# Patient Record
Sex: MALE | Race: WHITE | NOT HISPANIC OR LATINO | Employment: FULL TIME | URBAN - NONMETROPOLITAN AREA
[De-identification: names, ages, dates, MRNs, and addresses within clinical notes are randomized per-mention and may not be internally consistent; named-entity substitution may affect disease eponyms.]

---

## 2018-03-19 ENCOUNTER — OFFICE VISIT CONVERTED (OUTPATIENT)
Dept: FAMILY MEDICINE CLINIC | Age: 48
End: 2018-03-19
Attending: NURSE PRACTITIONER

## 2018-06-20 ENCOUNTER — APPOINTMENT (OUTPATIENT)
Dept: PREADMISSION TESTING | Facility: HOSPITAL | Age: 48
End: 2018-06-20

## 2018-06-20 VITALS
DIASTOLIC BLOOD PRESSURE: 89 MMHG | HEART RATE: 76 BPM | RESPIRATION RATE: 20 BRPM | BODY MASS INDEX: 20.51 KG/M2 | TEMPERATURE: 97.5 F | WEIGHT: 138.5 LBS | OXYGEN SATURATION: 100 % | HEIGHT: 69 IN | SYSTOLIC BLOOD PRESSURE: 138 MMHG

## 2018-06-20 LAB
ANION GAP SERPL CALCULATED.3IONS-SCNC: 11.8 MMOL/L
BUN BLD-MCNC: 7 MG/DL (ref 6–20)
BUN/CREAT SERPL: 9 (ref 7–25)
CALCIUM SPEC-SCNC: 9.1 MG/DL (ref 8.6–10.5)
CHLORIDE SERPL-SCNC: 96 MMOL/L (ref 98–107)
CO2 SERPL-SCNC: 25.2 MMOL/L (ref 22–29)
CREAT BLD-MCNC: 0.78 MG/DL (ref 0.76–1.27)
DEPRECATED RDW RBC AUTO: 45.9 FL (ref 37–54)
ERYTHROCYTE [DISTWIDTH] IN BLOOD BY AUTOMATED COUNT: 12.5 % (ref 11.5–14.5)
GFR SERPL CREATININE-BSD FRML MDRD: 105 ML/MIN/1.73
GLUCOSE BLD-MCNC: 98 MG/DL (ref 65–99)
HCT VFR BLD AUTO: 48 % (ref 40.4–52.2)
HGB BLD-MCNC: 16 G/DL (ref 13.7–17.6)
MCH RBC QN AUTO: 33.1 PG (ref 27–32.7)
MCHC RBC AUTO-ENTMCNC: 33.3 G/DL (ref 32.6–36.4)
MCV RBC AUTO: 99.2 FL (ref 79.8–96.2)
PLATELET # BLD AUTO: 191 10*3/MM3 (ref 140–500)
PMV BLD AUTO: 9.9 FL (ref 6–12)
POTASSIUM BLD-SCNC: 3.8 MMOL/L (ref 3.5–5.2)
RBC # BLD AUTO: 4.84 10*6/MM3 (ref 4.6–6)
SODIUM BLD-SCNC: 133 MMOL/L (ref 136–145)
WBC NRBC COR # BLD: 5.71 10*3/MM3 (ref 4.5–10.7)

## 2018-06-20 PROCEDURE — 80048 BASIC METABOLIC PNL TOTAL CA: CPT | Performed by: UROLOGY

## 2018-06-20 PROCEDURE — 93010 ELECTROCARDIOGRAM REPORT: CPT | Performed by: INTERNAL MEDICINE

## 2018-06-20 PROCEDURE — 36415 COLL VENOUS BLD VENIPUNCTURE: CPT

## 2018-06-20 PROCEDURE — 85027 COMPLETE CBC AUTOMATED: CPT | Performed by: UROLOGY

## 2018-06-20 PROCEDURE — 93005 ELECTROCARDIOGRAM TRACING: CPT

## 2018-06-20 RX ORDER — VALACYCLOVIR HYDROCHLORIDE 500 MG/1
500 TABLET, FILM COATED ORAL AS NEEDED
COMMUNITY

## 2018-06-20 RX ORDER — IBUPROFEN 400 MG/1
400 TABLET ORAL EVERY 6 HOURS PRN
COMMUNITY

## 2018-06-20 RX ORDER — CELECOXIB 200 MG/1
200 CAPSULE ORAL DAILY PRN
COMMUNITY

## 2018-06-20 NOTE — DISCHARGE INSTRUCTIONS
Take the following medications the morning of surgery with a small sip of water: none  STOP AS OF TODAY ANY ANTIINFLAMMATORY, HERBAL, VITAMIN, IBUPROFEN/CELEBREX.  ARRIVE TO THE MAIN SURGERY DESK THE DAY OF YOUR SURGERY BY 2PM        General Instructions:  • NOTHING TO EAT OR DRINK AFTER 8 AM THE MORNING OF YOUR SURGERY PER OFFICE PAPERWORK.  • It is beneficial for you to have a clear drink that contains carbohydrates the day of surgery.  We suggest a 12 to 20 ounce bottle of Gatorade or Powerade for non-diabetic patients or a 12 to 20 ounce bottle of G2 or Powerade Zero for diabetic patients. (Pediatric patients, are not advised to drink a 12 to 20 ounce carbohydrate drink)    Clear liquids are liquids you can see through.  Nothing red in color.     Plain water                               Sports drinks  Sodas                                   Gelatin (Jell-O)  Fruit juices without pulp such as white grape juice and apple juice  Popsicles that contain no fruit or yogurt  Tea or coffee (no cream or milk added)  Gatorade / Powerade  G2 / Powerade Zero    • Infants may have breast milk up to four hours before surgery.  • Infants drinking formula may drink formula up to six hours before surgery.   • Patients who avoid smoking, chewing tobacco and alcohol for 4 weeks prior to surgery have a reduced risk of post-operative complications.  Quit smoking as many days before surgery as you can.  • Do not smoke, use chewing tobacco or drink alcohol the day of surgery.   • If applicable bring your C-PAP/ BI-PAP machine.  • Bring any papers given to you in the doctor’s office.  • Wear clean comfortable clothes and socks.  • Do not wear contact lenses or make-up.  Bring a case for your glasses.   • Bring crutches or walker if applicable.  • Remove all piercings.  Leave jewelry and any other valuables at home.  • Hair extensions with metal clips must be removed prior to surgery.  • The Pre-Admission Testing nurse will instruct  you to bring medications if unable to obtain an accurate list in Pre-Admission Testing.        If you were given a blood bank ID arm band remember to bring it with you the day of surgery.    Preventing a Surgical Site Infection:  • For 2 to 3 days before surgery, avoid shaving with a razor because the razor can irritate skin and make it easier to develop an infection.  • The night prior to surgery sleep in a clean bed with clean clothing.  Do not allow pets to sleep with you.  • Shower on the morning of surgery using a fresh bar of anti-bacterial soap (such as Dial) and clean washcloth.  Dry with a clean towel and dress in clean clothing.  • Ask your surgeon if you will be receiving antibiotics prior to surgery.  • Make sure you, your family, and all healthcare providers clean their hands with soap and water or an alcohol based hand  before caring for you or your wound.    Day of surgery:  Upon arrival, a Pre-op nurse and Anesthesiologist will review your health history, obtain vital signs, and answer questions you may have.  The only belongings needed at this time will be your home medications and if applicable your C-PAP/BI-PAP machine.  If you are staying overnight your family can leave the rest of your belongings in the car and bring them to your room later.  A Pre-op nurse will start an IV and you may receive medication in preparation for surgery, including something to help you relax.  Your family will be able to see you in the Pre-op area.  While you are in surgery your family should notify the waiting room  if they leave the waiting room area and provide a contact phone number.    Please be aware that surgery does come with discomfort.  We want to make every effort to control your discomfort so please discuss any uncontrolled symptoms with your nurse.   Your doctor will most likely have prescribed pain medications.      If you are going home after surgery you will receive individualized  written care instructions before being discharged.  A responsible adult must drive you to and from the hospital on the day of your surgery and stay with you for 24 hours.    If you are staying overnight following surgery, you will be transported to your hospital room following the recovery period.  [facility] has all private rooms.    If you have any questions please call Pre-Admission Testing at [phone].  Deductibles and co-payments are collected on the day of service. Please be prepared to pay the required co-pay, deductible or deposit on the day of service as defined by your plan.

## 2018-06-27 ENCOUNTER — HOSPITAL ENCOUNTER (OUTPATIENT)
Facility: HOSPITAL | Age: 48
Setting detail: HOSPITAL OUTPATIENT SURGERY
Discharge: HOME | End: 2018-06-27
Attending: UROLOGY | Admitting: UROLOGY

## 2018-06-27 ENCOUNTER — ANESTHESIA EVENT (OUTPATIENT)
Dept: PERIOP | Facility: HOSPITAL | Age: 48
End: 2018-06-27

## 2018-06-27 ENCOUNTER — ANESTHESIA (OUTPATIENT)
Dept: PERIOP | Facility: HOSPITAL | Age: 48
End: 2018-06-27

## 2018-06-27 VITALS
OXYGEN SATURATION: 99 % | DIASTOLIC BLOOD PRESSURE: 104 MMHG | TEMPERATURE: 97.4 F | SYSTOLIC BLOOD PRESSURE: 149 MMHG | HEART RATE: 59 BPM | RESPIRATION RATE: 16 BRPM

## 2018-06-27 DIAGNOSIS — N43.3 HYDROCELE, LEFT: Primary | ICD-10-CM

## 2018-06-27 PROCEDURE — 25010000002 MIDAZOLAM PER 1 MG: Performed by: ANESTHESIOLOGY

## 2018-06-27 PROCEDURE — 25010000002 DEXAMETHASONE PER 1 MG: Performed by: ANESTHESIOLOGY

## 2018-06-27 PROCEDURE — 25010000003 CEFAZOLIN 1-4 GM/50ML-% SOLUTION: Performed by: UROLOGY

## 2018-06-27 PROCEDURE — 25010000002 KETOROLAC TROMETHAMINE PER 15 MG: Performed by: NURSE ANESTHETIST, CERTIFIED REGISTERED

## 2018-06-27 PROCEDURE — 25010000002 PROPOFOL 10 MG/ML EMULSION: Performed by: ANESTHESIOLOGY

## 2018-06-27 PROCEDURE — 25010000002 FENTANYL CITRATE (PF) 100 MCG/2ML SOLUTION: Performed by: ANESTHESIOLOGY

## 2018-06-27 PROCEDURE — 88305 TISSUE EXAM BY PATHOLOGIST: CPT | Performed by: UROLOGY

## 2018-06-27 PROCEDURE — 25010000002 ONDANSETRON PER 1 MG: Performed by: ANESTHESIOLOGY

## 2018-06-27 RX ORDER — FENTANYL CITRATE 50 UG/ML
50 INJECTION, SOLUTION INTRAMUSCULAR; INTRAVENOUS
Status: DISCONTINUED | OUTPATIENT
Start: 2018-06-27 | End: 2018-06-27 | Stop reason: HOSPADM

## 2018-06-27 RX ORDER — PROMETHAZINE HYDROCHLORIDE 25 MG/1
25 SUPPOSITORY RECTAL ONCE AS NEEDED
Status: DISCONTINUED | OUTPATIENT
Start: 2018-06-27 | End: 2018-06-27 | Stop reason: HOSPADM

## 2018-06-27 RX ORDER — PROMETHAZINE HYDROCHLORIDE 25 MG/ML
12.5 INJECTION, SOLUTION INTRAMUSCULAR; INTRAVENOUS ONCE AS NEEDED
Status: DISCONTINUED | OUTPATIENT
Start: 2018-06-27 | End: 2018-06-27 | Stop reason: HOSPADM

## 2018-06-27 RX ORDER — HYDROMORPHONE HYDROCHLORIDE 1 MG/ML
0.5 INJECTION, SOLUTION INTRAMUSCULAR; INTRAVENOUS; SUBCUTANEOUS
Status: DISCONTINUED | OUTPATIENT
Start: 2018-06-27 | End: 2018-06-27 | Stop reason: HOSPADM

## 2018-06-27 RX ORDER — MIDAZOLAM HYDROCHLORIDE 1 MG/ML
1 INJECTION INTRAMUSCULAR; INTRAVENOUS
Status: DISCONTINUED | OUTPATIENT
Start: 2018-06-27 | End: 2018-06-27 | Stop reason: HOSPADM

## 2018-06-27 RX ORDER — BUPIVACAINE HYDROCHLORIDE 2.5 MG/ML
INJECTION, SOLUTION INFILTRATION; PERINEURAL AS NEEDED
Status: DISCONTINUED | OUTPATIENT
Start: 2018-06-27 | End: 2018-06-27 | Stop reason: HOSPADM

## 2018-06-27 RX ORDER — PROPOFOL 10 MG/ML
VIAL (ML) INTRAVENOUS AS NEEDED
Status: DISCONTINUED | OUTPATIENT
Start: 2018-06-27 | End: 2018-06-27 | Stop reason: SURG

## 2018-06-27 RX ORDER — FAMOTIDINE 10 MG/ML
20 INJECTION, SOLUTION INTRAVENOUS ONCE
Status: COMPLETED | OUTPATIENT
Start: 2018-06-27 | End: 2018-06-27

## 2018-06-27 RX ORDER — PROMETHAZINE HYDROCHLORIDE 25 MG/1
25 TABLET ORAL ONCE AS NEEDED
Status: DISCONTINUED | OUTPATIENT
Start: 2018-06-27 | End: 2018-06-27 | Stop reason: HOSPADM

## 2018-06-27 RX ORDER — LABETALOL HYDROCHLORIDE 5 MG/ML
5 INJECTION, SOLUTION INTRAVENOUS
Status: DISCONTINUED | OUTPATIENT
Start: 2018-06-27 | End: 2018-06-27 | Stop reason: HOSPADM

## 2018-06-27 RX ORDER — CEFAZOLIN SODIUM 1 G/50ML
1 INJECTION, SOLUTION INTRAVENOUS ONCE
Status: COMPLETED | OUTPATIENT
Start: 2018-06-27 | End: 2018-06-27

## 2018-06-27 RX ORDER — KETOROLAC TROMETHAMINE 30 MG/ML
INJECTION, SOLUTION INTRAMUSCULAR; INTRAVENOUS AS NEEDED
Status: DISCONTINUED | OUTPATIENT
Start: 2018-06-27 | End: 2018-06-27 | Stop reason: SURG

## 2018-06-27 RX ORDER — MIDAZOLAM HYDROCHLORIDE 1 MG/ML
2 INJECTION INTRAMUSCULAR; INTRAVENOUS
Status: DISCONTINUED | OUTPATIENT
Start: 2018-06-27 | End: 2018-06-27 | Stop reason: HOSPADM

## 2018-06-27 RX ORDER — OXYCODONE AND ACETAMINOPHEN 7.5; 325 MG/1; MG/1
1 TABLET ORAL ONCE AS NEEDED
Status: DISCONTINUED | OUTPATIENT
Start: 2018-06-27 | End: 2018-06-27 | Stop reason: HOSPADM

## 2018-06-27 RX ORDER — DEXAMETHASONE SODIUM PHOSPHATE 10 MG/ML
INJECTION INTRAMUSCULAR; INTRAVENOUS AS NEEDED
Status: DISCONTINUED | OUTPATIENT
Start: 2018-06-27 | End: 2018-06-27 | Stop reason: SURG

## 2018-06-27 RX ORDER — HYDROCODONE BITARTRATE AND ACETAMINOPHEN 7.5; 325 MG/1; MG/1
1 TABLET ORAL ONCE AS NEEDED
Status: DISCONTINUED | OUTPATIENT
Start: 2018-06-27 | End: 2018-06-27 | Stop reason: HOSPADM

## 2018-06-27 RX ORDER — SODIUM CHLORIDE, SODIUM LACTATE, POTASSIUM CHLORIDE, CALCIUM CHLORIDE 600; 310; 30; 20 MG/100ML; MG/100ML; MG/100ML; MG/100ML
9 INJECTION, SOLUTION INTRAVENOUS CONTINUOUS
Status: DISCONTINUED | OUTPATIENT
Start: 2018-06-27 | End: 2018-06-27 | Stop reason: HOSPADM

## 2018-06-27 RX ORDER — LIDOCAINE HYDROCHLORIDE 10 MG/ML
0.5 INJECTION, SOLUTION EPIDURAL; INFILTRATION; INTRACAUDAL; PERINEURAL ONCE AS NEEDED
Status: DISCONTINUED | OUTPATIENT
Start: 2018-06-27 | End: 2018-06-27 | Stop reason: HOSPADM

## 2018-06-27 RX ORDER — PROMETHAZINE HYDROCHLORIDE 25 MG/1
12.5 TABLET ORAL ONCE AS NEEDED
Status: DISCONTINUED | OUTPATIENT
Start: 2018-06-27 | End: 2018-06-27 | Stop reason: HOSPADM

## 2018-06-27 RX ORDER — LIDOCAINE HYDROCHLORIDE 20 MG/ML
INJECTION, SOLUTION INFILTRATION; PERINEURAL AS NEEDED
Status: DISCONTINUED | OUTPATIENT
Start: 2018-06-27 | End: 2018-06-27 | Stop reason: SURG

## 2018-06-27 RX ORDER — ONDANSETRON 2 MG/ML
4 INJECTION INTRAMUSCULAR; INTRAVENOUS ONCE AS NEEDED
Status: DISCONTINUED | OUTPATIENT
Start: 2018-06-27 | End: 2018-06-27 | Stop reason: HOSPADM

## 2018-06-27 RX ORDER — ONDANSETRON 2 MG/ML
INJECTION INTRAMUSCULAR; INTRAVENOUS AS NEEDED
Status: DISCONTINUED | OUTPATIENT
Start: 2018-06-27 | End: 2018-06-27 | Stop reason: SURG

## 2018-06-27 RX ORDER — MAGNESIUM HYDROXIDE 1200 MG/15ML
LIQUID ORAL AS NEEDED
Status: DISCONTINUED | OUTPATIENT
Start: 2018-06-27 | End: 2018-06-27 | Stop reason: HOSPADM

## 2018-06-27 RX ORDER — HYDROCODONE BITARTRATE AND ACETAMINOPHEN 7.5; 325 MG/1; MG/1
1-2 TABLET ORAL EVERY 4 HOURS PRN
Qty: 20 TABLET | Refills: 0 | Status: SHIPPED | OUTPATIENT
Start: 2018-06-27 | End: 2023-03-13

## 2018-06-27 RX ORDER — DIPHENHYDRAMINE HYDROCHLORIDE 50 MG/ML
12.5 INJECTION INTRAMUSCULAR; INTRAVENOUS
Status: DISCONTINUED | OUTPATIENT
Start: 2018-06-27 | End: 2018-06-27 | Stop reason: HOSPADM

## 2018-06-27 RX ORDER — EPHEDRINE SULFATE 50 MG/ML
5 INJECTION, SOLUTION INTRAVENOUS ONCE AS NEEDED
Status: DISCONTINUED | OUTPATIENT
Start: 2018-06-27 | End: 2018-06-27 | Stop reason: HOSPADM

## 2018-06-27 RX ORDER — FLUTICASONE PROPIONATE 50 MCG
2 SPRAY, SUSPENSION (ML) NASAL DAILY
COMMUNITY

## 2018-06-27 RX ORDER — NALOXONE HCL 0.4 MG/ML
0.2 VIAL (ML) INJECTION AS NEEDED
Status: DISCONTINUED | OUTPATIENT
Start: 2018-06-27 | End: 2018-06-27 | Stop reason: HOSPADM

## 2018-06-27 RX ORDER — SODIUM CHLORIDE 0.9 % (FLUSH) 0.9 %
1-10 SYRINGE (ML) INJECTION AS NEEDED
Status: DISCONTINUED | OUTPATIENT
Start: 2018-06-27 | End: 2018-06-27 | Stop reason: HOSPADM

## 2018-06-27 RX ORDER — FLUMAZENIL 0.1 MG/ML
0.2 INJECTION INTRAVENOUS AS NEEDED
Status: DISCONTINUED | OUTPATIENT
Start: 2018-06-27 | End: 2018-06-27 | Stop reason: HOSPADM

## 2018-06-27 RX ORDER — HYDROCODONE BITARTRATE AND ACETAMINOPHEN 7.5; 325 MG/1; MG/1
1 TABLET ORAL ONCE AS NEEDED
Status: COMPLETED | OUTPATIENT
Start: 2018-06-27 | End: 2018-06-27

## 2018-06-27 RX ADMIN — HYDROCODONE BITARTRATE AND ACETAMINOPHEN 1 TABLET: 7.5; 325 TABLET ORAL at 17:51

## 2018-06-27 RX ADMIN — KETOROLAC TROMETHAMINE 30 MG: 30 INJECTION, SOLUTION INTRAMUSCULAR; INTRAVENOUS at 16:59

## 2018-06-27 RX ADMIN — MIDAZOLAM 2 MG: 1 INJECTION INTRAMUSCULAR; INTRAVENOUS at 16:01

## 2018-06-27 RX ADMIN — DEXAMETHASONE SODIUM PHOSPHATE 8 MG: 10 INJECTION INTRAMUSCULAR; INTRAVENOUS at 16:43

## 2018-06-27 RX ADMIN — FAMOTIDINE 20 MG: 10 INJECTION, SOLUTION INTRAVENOUS at 14:45

## 2018-06-27 RX ADMIN — ONDANSETRON 4 MG: 2 INJECTION INTRAMUSCULAR; INTRAVENOUS at 16:44

## 2018-06-27 RX ADMIN — SODIUM CHLORIDE, POTASSIUM CHLORIDE, SODIUM LACTATE AND CALCIUM CHLORIDE 9 ML/HR: 600; 310; 30; 20 INJECTION, SOLUTION INTRAVENOUS at 14:45

## 2018-06-27 RX ADMIN — FENTANYL CITRATE 50 MCG: 50 INJECTION INTRAMUSCULAR; INTRAVENOUS at 16:40

## 2018-06-27 RX ADMIN — PROPOFOL 200 MG: 10 INJECTION, EMULSION INTRAVENOUS at 16:27

## 2018-06-27 RX ADMIN — CEFAZOLIN SODIUM 1 G: 1 INJECTION, SOLUTION INTRAVENOUS at 16:27

## 2018-06-27 RX ADMIN — SODIUM CHLORIDE, POTASSIUM CHLORIDE, SODIUM LACTATE AND CALCIUM CHLORIDE: 600; 310; 30; 20 INJECTION, SOLUTION INTRAVENOUS at 16:21

## 2018-06-27 RX ADMIN — LIDOCAINE HYDROCHLORIDE 50 MG: 20 INJECTION, SOLUTION INFILTRATION; PERINEURAL at 16:27

## 2018-06-27 NOTE — ANESTHESIA POSTPROCEDURE EVALUATION
Patient: Alexandr Willis    Procedure Summary     Date:  06/27/18 Room / Location:  [facility] OR  / [facility] MAIN OR    Anesthesia Start:  1621 Anesthesia Stop:  1716    Procedure:  LEFT HYDROCELECTOMY UNILATERAL, EXCISION OF EPIDYDIMAL CYST (Left Scrotum) Diagnosis:       Hydrocele, left      (Left Hydrocoele)    Surgeon:  Chago Teran MD Provider:  Darren Russell MD    Anesthesia Type:  general ASA Status:  2          Anesthesia Type: general  Last vitals  BP   (!) 149/104 (06/27/18 1815)   Temp   36.3 °C (97.4 °F) (06/27/18 1711)   Pulse   59 (06/27/18 1815)   Resp   16 (06/27/18 1815)     SpO2   99 % (06/27/18 1815)     Post Anesthesia Care and Evaluation    Patient location during evaluation: PHASE II  Patient participation: complete - patient participated  Level of consciousness: awake  Pain management: adequate  Airway patency: patent  Anesthetic complications: No anesthetic complications    Cardiovascular status: acceptable  Respiratory status: acceptable  Hydration status: acceptable    Comments: BP (!) 149/104   Pulse 59   Temp 36.3 °C (97.4 °F) (Oral)   Resp 16   SpO2 99%

## 2018-06-27 NOTE — ANESTHESIA PREPROCEDURE EVALUATION
Anesthesia Evaluation     Patient summary reviewed   no history of anesthetic complications:  NPO Solid Status: > 6 hours             Airway   Mallampati: II  TM distance: >3 FB  Neck ROM: full  No difficulty expected  Dental          Pulmonary - normal exam   (+) a smoker Current Abstained day of surgery,   Cardiovascular - normal exam    ECG reviewed    (-) angina      Neuro/Psych  GI/Hepatic/Renal/Endo      Musculoskeletal     Abdominal    Substance History      OB/GYN          Other                        Anesthesia Plan    ASA 2     general     Anesthetic plan and risks discussed with patient.

## 2018-06-27 NOTE — ANESTHESIA PROCEDURE NOTES
Airway  Urgency: elective    Airway not difficult    General Information and Staff    Patient location during procedure: OR  Anesthesiologist: SMITHA HALL    Indications and Patient Condition  Indications for airway management: airway protection    Preoxygenated: yes  MILS maintained throughout  Mask difficulty assessment: 1 - vent by mask    Final Airway Details  Final airway type: supraglottic airway      Successful airway: classic  Size 5    Number of attempts at approach: 1

## 2018-06-29 LAB
CYTO UR: NORMAL
LAB AP CASE REPORT: NORMAL
PATH REPORT.FINAL DX SPEC: NORMAL
PATH REPORT.GROSS SPEC: NORMAL

## 2018-07-24 ENCOUNTER — OFFICE VISIT CONVERTED (OUTPATIENT)
Dept: FAMILY MEDICINE CLINIC | Age: 48
End: 2018-07-24
Attending: NURSE PRACTITIONER

## 2021-05-10 ENCOUNTER — OFFICE VISIT CONVERTED (OUTPATIENT)
Dept: FAMILY MEDICINE CLINIC | Age: 51
End: 2021-05-10
Attending: NURSE PRACTITIONER

## 2021-05-10 ENCOUNTER — HOSPITAL ENCOUNTER (OUTPATIENT)
Dept: OTHER | Facility: HOSPITAL | Age: 51
Discharge: HOME | End: 2021-05-10
Attending: NURSE PRACTITIONER

## 2021-05-10 LAB
ALBUMIN SERPL-MCNC: 4.2 G/DL (ref 3.5–5)
ALBUMIN/GLOB SERPL: 1.3 {RATIO} (ref 1.4–2.6)
ALP SERPL-CCNC: 185 U/L (ref 56–119)
ALT SERPL-CCNC: 152 U/L (ref 10–40)
ANION GAP SERPL CALC-SCNC: 19 MMOL/L (ref 8–19)
AST SERPL-CCNC: 194 U/L (ref 15–50)
BASOPHILS # BLD AUTO: 0.08 10*3/UL (ref 0–0.2)
BASOPHILS NFR BLD AUTO: 1.5 % (ref 0–3)
BILIRUB SERPL-MCNC: 0.32 MG/DL (ref 0.2–1.3)
BUN SERPL-MCNC: 4 MG/DL (ref 5–25)
BUN/CREAT SERPL: 5 {RATIO} (ref 6–20)
CALCIUM SERPL-MCNC: 8.7 MG/DL (ref 8.7–10.4)
CHLORIDE SERPL-SCNC: 95 MMOL/L (ref 99–111)
CONV ABS IMM GRAN: 0.02 10*3/UL (ref 0–0.2)
CONV CO2: 24 MMOL/L (ref 22–32)
CONV IMMATURE GRAN: 0.4 % (ref 0–1.8)
CONV TOTAL PROTEIN: 7.5 G/DL (ref 6.3–8.2)
CREAT UR-MCNC: 0.78 MG/DL (ref 0.7–1.2)
DEPRECATED RDW RBC AUTO: 45.2 FL (ref 35.1–43.9)
EOSINOPHIL # BLD AUTO: 0.11 10*3/UL (ref 0–0.7)
EOSINOPHIL # BLD AUTO: 2 % (ref 0–7)
ERYTHROCYTE [DISTWIDTH] IN BLOOD BY AUTOMATED COUNT: 12.5 % (ref 11.6–14.4)
GFR SERPLBLD BASED ON 1.73 SQ M-ARVRAT: >60 ML/MIN/{1.73_M2}
GLOBULIN UR ELPH-MCNC: 3.3 G/DL (ref 2–3.5)
GLUCOSE SERPL-MCNC: 100 MG/DL (ref 70–99)
HCT VFR BLD AUTO: 46.7 % (ref 42–52)
HGB BLD-MCNC: 16.4 G/DL (ref 14–18)
LYMPHOCYTES # BLD AUTO: 2.33 10*3/UL (ref 1–5)
LYMPHOCYTES NFR BLD AUTO: 42.7 % (ref 20–45)
MCH RBC QN AUTO: 34.3 PG (ref 27–31)
MCHC RBC AUTO-ENTMCNC: 35.1 G/DL (ref 33–37)
MCV RBC AUTO: 97.7 FL (ref 80–96)
MONOCYTES # BLD AUTO: 0.76 10*3/UL (ref 0.2–1.2)
MONOCYTES NFR BLD AUTO: 13.9 % (ref 3–10)
NEUTROPHILS # BLD AUTO: 2.16 10*3/UL (ref 2–8)
NEUTROPHILS NFR BLD AUTO: 39.5 % (ref 30–85)
NRBC CBCN: 0 % (ref 0–0.7)
OSMOLALITY SERPL CALC.SUM OF ELEC: 275 MOSM/KG (ref 273–304)
PLATELET # BLD AUTO: 234 10*3/UL (ref 130–400)
PMV BLD AUTO: 9 FL (ref 9.4–12.4)
POTASSIUM SERPL-SCNC: 3.9 MMOL/L (ref 3.5–5.3)
RBC # BLD AUTO: 4.78 10*6/UL (ref 4.7–6.1)
SODIUM SERPL-SCNC: 134 MMOL/L (ref 135–147)
TSH SERPL-ACNC: 2.91 M[IU]/L (ref 0.27–4.2)
WBC # BLD AUTO: 5.46 10*3/UL (ref 4.8–10.8)

## 2021-05-14 ENCOUNTER — HOSPITAL ENCOUNTER (OUTPATIENT)
Dept: OTHER | Facility: HOSPITAL | Age: 51
Discharge: HOME | End: 2021-05-14
Attending: NURSE PRACTITIONER

## 2021-05-14 LAB — PSA SERPL-MCNC: 2.03 NG/ML (ref 0–4)

## 2021-05-15 LAB
CONV HEPATITIS B SURFACE AG W CONFIRMATION RE: NEGATIVE
HAV IGM SERPL QL IA: NEGATIVE
HBV CORE IGM SERPL QL IA: NEGATIVE
HCV AB SER DONR QL: <0.1 S/CO RATIO (ref 0–0.9)

## 2021-05-18 NOTE — PROGRESS NOTES
Alexandr Willis 1970     Office/Outpatient Visit    Visit Date: Tue, Jul 24, 2018 02:14 pm    Provider: Mary Velasquez N.P. (Assistant: Sabine Monzon RN)    Location: [facility]        Electronically signed by Mary Velasquez N.P. on  07/24/2018 02:49:04 PM                             SUBJECTIVE:        CC:     Natasha is a 48 year old White male.  Smoking cessation         HPI:         Natasha desires help with smoking cessation.  smokes 2-3 cigars per week  and he also uses smokeless tobacco at times      ROS:     CONSTITUTIONAL:  Negative for chills, fatigue, fever, and weight change.      CARDIOVASCULAR:  Negative for chest pain, palpitations, tachycardia, orthopnea, and edema.      RESPIRATORY:  Negative for cough, dyspnea, and hemoptysis.      INTEGUMENTARY:  Positive for wants refills on valtrex for his cold sores.      NEUROLOGICAL:  Negative for dizziness, headaches, paresthesias, and weakness.          PMH/FMH/SH:     Last Reviewed on 7/24/2018 02:32 PM by Mary Velasquez    Past Medical History:         Positive for    Prostatitis;         Surgical History:         Arthroscopy: knee left X 2;     Biopsy of prostate      Biomet Knee replacement 12-, left;    hemorrhoid banding; Procedures: colonoscopy 2009     Procedures: epididymal cysts 6-27-18         Family History:     Reviewed.         Social History:     Occupation: [employer] supervisor     Marital Status:      Children: 2 children         Tobacco/Alcohol/Supplements:     Last Reviewed on 7/24/2018 02:17 PM by Sabine Monzon    Tobacco: Current Smoker: He currently smokes every day, 2 cigars per week.              Immunizations:     None        Allergies:     Last Reviewed on 7/24/2018 02:16 PM by Sabine Monzon    Zoloft: dizziness, fatigue, nausea (Adverse Reaction)    Avalox*:        Current Medications:     Last Reviewed on 7/24/2018 02:17 PM by Sabine Monzon    Valacyclovir 500mg Tablet Take 1 tablet(s)  by mouth bid for 3 days  prn     EpiPen Auto-Injector 1:1,000 Solution For Injection As Directed     Celebrex 200mg Capsules prn     Flonase     SAW PALMETTO     Bactrim DS Tablet 1 po bid x 14 days         OBJECTIVE:        Vitals:         Current: 7/24/2018 2:16:22 PM    Ht:  5 ft, 9 in;  Wt: 137.5 lbs;  BMI: 20.3    T: 97 F (oral);  BP: 131/80 mm Hg (left arm, sitting);  P: 87 bpm (left arm (BP Cuff), sitting)        Exams:     PHYSICAL EXAM:     GENERAL: Vitals recorded well developed, well nourished;  well groomed;  no apparent distress;     RESPIRATORY: normal respiratory rate and pattern with no distress; normal breath sounds with no rales, rhonchi, wheezes or rubs;     CARDIOVASCULAR: normal rate; rhythm is regular;  no systolic murmur;     PSYCHIATRIC:  appropriate affect and demeanor; normal speech pattern; grossly normal memory;         ASSESSMENT           305.1   F17.210  Tobacco abuse              DDx:     V76.41   Z12.12  Screening for rectal cancer              DDx:     054.2   B00.9  Cold sore due to Herpes simplex              DDx:         ORDERS:         Meds Prescribed:       Refill of: Valacyclovir 500mg Tablet Take 1 tablet(s) by mouth bid for 3 days  prn  #60 (Sixty) tablet(s) Refills: 2       Varenicline Tartrate 0.5mg/1mg Tablet Start 0.5mg daily x 3 days, then 0.5mg twice daily x 4 days, Max 2mg/day. Take with food, start drug 1 week before quit date  #1 (One) box Refills: 2                 PLAN:          Tobacco abuse reviewed Chantix risk vs benefit, will wait until he completes Bactrim from his urologist before trying chantix           Prescriptions:       Varenicline Tartrate 0.5mg/1mg Tablet Start 0.5mg daily x 3 days, then 0.5mg twice daily x 4 days, Max 2mg/day. Take with food, start drug 1 week before quit date  #1 (One) box Refills: 2          Screening for rectal cancer Miki did his last colon screening, he is considering, will call back when he is ready to schedule            Patient Education Handouts:       Colonoscopy           Cold sore due to Herpes simplex           Prescriptions:       Refill of: Valacyclovir 500mg Tablet Take 1 tablet(s) by mouth bid for 3 days  prn  #60 (Sixty) tablet(s) Refills: 2             CHARGE CAPTURE           **Please note: ICD descriptions below are intended for billing purposes only and may not represent clinical diagnoses**        Primary Diagnosis:         305.1 Tobacco abuse            F17.210    Nicotine dependence, cigarettes, uncomplicated              Orders:          08002   Office/outpatient visit; established patient, level 3  (In-House)           V76.41 Screening for rectal cancer            Z12.12    Encounter for screening for malignant neoplasm of rectum    054.2 Cold sore due to Herpes simplex            B00.9    Herpesviral infection, unspecified        ADDENDUMS:      ____________________________________    Date: 10/29/2018 03:55 PM    Author: Chary Eaton         Visit Note Faxed to:        Aron Livingston  (General Surgery); Number [phone]

## 2021-05-18 NOTE — PROGRESS NOTES
"Alexandr Willis  1970     Office/Outpatient Visit    Visit Date: Mon, May 10, 2021 03:55 pm    Provider: Mary Velasquez N.P. (Assistant: Susana Welch,  )    Location: [facility]        Electronically signed by Mary Velasquez N.P. on  05/10/2021 07:14:34 PM                             Subjective:        CC: PT NOT TAKING EPI, BACTRIM, VALACYCLOVIR, OR SAW Isela is a 50 year old White male.  PE and pt thinks he is having some blood sugar issues. Pt states his sugar has been droping on him. He has been having this issue since 9/2020         HPI:           PHQ-9 Depression Screening: Completed form scanned and in chart; Total Score 3           HEALTH RISK PROFILE (\"At Risk\" items are starred):         Smoking: ** Currently smokes ( cigars some days );     Nutrition: not eating healthy;     Physical Activity: physically active/ works another job on weekends;     Alcohol/Drug Use: Consumes 1-3 alcoholic beverage(s)/day; Typically drinks beer; No illicit drug use;     ROS:     CONSTITUTIONAL:  Positive for unintentional weight gain.   Negative for fever.      CARDIOVASCULAR:  Negative for chest pain, palpitations, tachycardia, orthopnea, and edema.      RESPIRATORY:  Negative for recent cough and dyspnea.      GASTROINTESTINAL:  Positive for anorexia.      MUSCULOSKELETAL:  Positive for mild knee pains.      INTEGUMENTARY:  Negative for rash.      NEUROLOGICAL:  Positive for memory loss (started in 9-2020).  wife comes with him today, memory loss/ changes, seems worse since 9-2020, but seems related to working from home; he sometimes does not eat during the day and when she gets home, he seems dazed; this is variable    HEMATOLOGIC/LYMPHATIC:  Negative for known exposure to covid, or having covid.      PSYCHIATRIC:  Positive for (insomnia, takes melatonin sometimes).   Negative for depression.          Past Medical History / Family History / Social History:         Last Reviewed on " 5/10/2021 04:17 PM by Mary Velasquez    Past Medical History:         Positive for    Prostatitis;         PREVENTIVE HEALTH MAINTENANCE             COLORECTAL CANCER SCREENING: Up to date (colonoscopy q10y; sigmoidoscopy q5y; Cologuard q3y) was last done 18, Results are in chart; colonoscopy with normal results; The next colonoscopy is due           Surgical History:         Arthroscopy: knee left X 2;     Biopsy of prostate     Biomet Knee replacement 2010, left;    hemorrhoid banding; Procedures: colonoscopy      Procedures: epididymal cysts 18         Family History:     Father:  at age 74; Cause of death was colon cancer     Mother:  at age 60's;  Rheumatoid Arthritis     Brother(s): Healthy; 1 brother(s) total     Sister(s): 1 sister(s) total;  arthritis     Paternal Grandfather: Cause of death was cerebral aneurysm     Paternal Grandmother: ;  Alzheimer's Disease     Maternal Grandfather:      Maternal Grandmother:  at age 80's         Social History:     Occupation: [employer]      Marital Status:  ( and remarried)     Children: 2 children and 2 step-children         Tobacco/Alcohol/Supplements:     Last Reviewed on 2018 02:17 PM by Sabine Monzon    Tobacco: Current Smoker: He currently smokes some days, Cigars.          Alcohol: Frequency: Daily When he drinks, the average quantity of alcohol is 1-3 beers most days.          Immunizations: not had covid or vaccines     None        Allergies:     Last Reviewed on 5/10/2021 03:58 PM by Susana Welch    Zoloft: dizziness, fatigue, nausea  (Adverse Reaction)    Avalox*:          Current Medications:     Last Reviewed on 5/10/2021 03:58 PM by Susana Welch    None        Objective:        Vitals:         Current: 5/10/2021 3:57:32 PM    Ht:  5 ft, 9 in;  Wt: 162 lbs;  BMI: 23.9T: 98 F (temporal);  BP: 138/93 mm Hg (right arm, sitting);  P: 80 bpm (right  arm (BP Cuff), sitting)        Exams:     PHYSICAL EXAM:     GENERAL: Vitals recorded well developed, well nourished;  well groomed;  no apparent distress;     EYES: PERRLA;     NECK: carotid exam reveals no bruits;     RESPIRATORY: normal respiratory rate and pattern with no distress; normal breath sounds with no rales, rhonchi, wheezes or rubs;     CARDIOVASCULAR: normal rate; rhythm is regular;  no systolic murmur; no edema;     SKIN: no rashes noted;     MUSCULOSKELETAL: normal gait;     NEUROLOGIC: GROSSLY INTACT     PSYCHIATRIC:  appropriate affect and demeanor; normal speech pattern; grossly normal memory;         Assessment:         Z00.00   Encounter for general adult medical examination without abnormal findings       Z13.31   Encounter for screening for depression       R41.3   Other amnesia           ORDERS:         Lab Orders:       [id number]  [facility] CBC with 3 part diff  (Send-Out)            81022  [facility] Comp. Metabolic Panel  (Send-Out)            50059  TSH - [facility] TSH  (Send-Out)            *  [employer] Medicare screening PSA  (Send-Out)              Other Orders:         Depression screen negative  (In-House)                      Plan:         Encounter for general adult medical examination without abnormal findingsUTD on colon screening  ( he was not fasting today, will need Lipid) advised COVID vaccines    LABORATORY:  Labs ordered to be performed today include PSA.      COUNSELING was provided today regarding the following topics: reduce alcohol, quit smoking cigars, discussed healthy eating.      FOLLOW-UP: pending labs           Orders:       *  [employer] Medicare screening PSA  (Send-Out)              Encounter for screening for depression    MIPS PHQ-9 Depression Screening: Completed form scanned and in chart; Total Score 3; Negative Depression Screen           Orders:         Depression screen negative  (In-House)              Other amnesiaconsider neurology consult      LABORATORY:  Labs ordered to be performed today include CBC, Comprehensive metabolic panel, and TSH.            Orders:       04955  [facility] - [facility] CBC with 3 part diff  (Send-Out)            49976  COMP - [facility] Comp. Metabolic Panel  (Send-Out)            66395  TSH - [facility] TSH  (Send-Out)                  Charge Capture:         Primary Diagnosis:     Z00.00  Encounter for general adult medical examination without abnormal findings           Orders:      33022  Preventive medicine, established patient, age 40-64 years  (In-House)              Z13.31  Encounter for screening for depression           Orders:        Depression screen negative  (In-House)              R41.3  Other amnesia

## 2021-05-18 NOTE — PROGRESS NOTES
Alexandr Willis 1970     Office/Outpatient Visit    Visit Date: Mon, Mar 19, 2018 06:01 pm    Provider: Yani Londono N.P. (Assistant: Brenda Benitez MA)    Location: [facility]        Electronically signed by Yani Londono N.P. on  2018 06:32:00 PM                             SUBJECTIVE:        CC:     Natasha is a 47 year old White male.  staple removal on head         HPI:         Fell and hit head on [place] 3/4/18 and had to go to  [facility] ER for sutures in his head. Tripped and fell over object and cut head open. Does not take blood thinner, the fall did  knock him out but was treated and released at [facility], sent here for removal of staples. Staples have been there 8 days and healing well. Denies fever or headaches.      ROS:     CONSTITUTIONAL:  Negative for chills, fatigue, fever and weight change.      CARDIOVASCULAR:  Negative for chest pain, orthopnea, paroxysmal nocturnal dyspnea and pedal edema.      RESPIRATORY:  Negative for dyspnea and cough.      GASTROINTESTINAL:  Negative for abdominal pain, heartburn, constipation, diarrhea, and stool changes.      INTEGUMENTARY:  Positive for 5 staples on back of head.          [facility]/:     Last Reviewed on 3/19/2018 06:23 PM by Yani Londono    Past Medical History:         Positive for    Prostatitis;         Surgical History:         Arthroscopy: knee left X 2;     Biopsy of prostate      Biomet Knee replacement 2010, left;    hemorrhoid banding; Procedures: colonoscopy          Family History:     Father:  at age 74; Cause of death was colon cancer     Mother:  at age 60's;  Rheumatoid Arthritis     Brother(s): Healthy; 1 brother(s) total     Sister(s): 1 sister(s) total;  arthritis     Paternal Grandfather: Cause of death was cerebral aneurysm     Paternal Grandmother: ;  Alzheimer's Disease     Maternal Grandfather:      Maternal Grandmother:  at age 80's         Social  History:     Occupation: [employer] supervisor     Marital Status:      Children: 2 children         Tobacco/Alcohol/Supplements:     Last Reviewed on 3/19/2018 06:23 PM by Yani Londono    Tobacco: He has a past history of cigar smoking. Quit Date:  7-2011             Current Problems:     Last Reviewed on 3/19/2018 06:23 PM by Yani Londono    Leg pain     Chronic prostatitis     Anxiety with depression     Joint pain, multiple sites     Encounter for removal of staples         Immunizations:     None        Allergies:     Last Reviewed on 3/19/2018 06:23 PM by Yani Londono    Zoloft: dizziness, fatigue, nausea (Adverse Reaction)    Avalox*:        Current Medications:     Last Reviewed on 3/19/2018 06:23 PM by Yani Londono    Valacyclovir 500mg Tablet Take 1 tablet(s) by mouth bid for 3 days  prn     EpiPen Auto-Injector 1:1,000 Solution For Injection As Directed     Celebrex     Flonase     SAW PALMETTO         OBJECTIVE:        Vitals:         Current: 3/19/2018 6:03:43 PM    Ht:  5 ft, 9 in;  Wt: 140.1 lbs;  BMI: 20.7    T: 97.4 F (oral);  BP: 146/92 mm Hg (left arm, sitting);  P: 83 bpm (left arm (BP Cuff), sitting)        Exams:     PHYSICAL EXAM:     GENERAL: Vitals recorded well developed, well nourished;  well groomed;  no apparent distress;     RESPIRATORY: normal respiratory rate and pattern with no distress; normal breath sounds with no rales, rhonchi, wheezes or rubs;     CARDIOVASCULAR: normal rate; rhythm is regular;  normal S1; normal S2; no systolic murmur; no cyanosis; no edema;     SKIN: staples intact to posterior head without erythema induration or tenderness, removed without diffculty;         Procedures:     Encounter for removal of staples     Sutures were removed today without difficulty.  The area is healed without signs of infection. 5 Staples removed dmt             ASSESSMENT:           V58.32   Z48.02  Encounter for removal of staples              DDx:          ORDERS:         Procedures Ordered:       63651  Postoperative follow-up visit, normally included in the surgical package with E&M service  (In-House)                   PLAN:          Encounter for removal of staples           Orders:       15815  Postoperative follow-up visit, normally included in the surgical package with E&M service  (In-House)               CHARGE CAPTURE:           Primary Diagnosis:     V58.32 Encounter for removal of staples            Z48.02    Encounter for removal of sutures              Orders:          08142   Postoperative follow-up visit, normally included in the surgical package with E&M service  (In-House)

## 2021-05-27 ENCOUNTER — HOSPITAL ENCOUNTER (OUTPATIENT)
Dept: OTHER | Facility: HOSPITAL | Age: 51
Discharge: HOME | End: 2021-05-27
Attending: NURSE PRACTITIONER

## 2021-06-09 ENCOUNTER — LAB (OUTPATIENT)
Dept: LAB | Facility: HOSPITAL | Age: 51
End: 2021-06-09

## 2021-06-09 ENCOUNTER — TELEPHONE (OUTPATIENT)
Dept: FAMILY MEDICINE CLINIC | Age: 51
End: 2021-06-09

## 2021-06-09 ENCOUNTER — OFFICE VISIT (OUTPATIENT)
Dept: FAMILY MEDICINE CLINIC | Age: 51
End: 2021-06-09

## 2021-06-09 VITALS
SYSTOLIC BLOOD PRESSURE: 134 MMHG | BODY MASS INDEX: 22.86 KG/M2 | WEIGHT: 154.8 LBS | TEMPERATURE: 97.4 F | HEART RATE: 77 BPM | DIASTOLIC BLOOD PRESSURE: 96 MMHG

## 2021-06-09 DIAGNOSIS — R03.0 ELEVATED BLOOD PRESSURE READING: ICD-10-CM

## 2021-06-09 DIAGNOSIS — R42 DIZZINESS AND GIDDINESS: ICD-10-CM

## 2021-06-09 DIAGNOSIS — R79.89 ABNORMAL LIVER FUNCTION TEST: ICD-10-CM

## 2021-06-09 DIAGNOSIS — R03.0 ELEVATED BLOOD PRESSURE READING: Primary | ICD-10-CM

## 2021-06-09 LAB
ALBUMIN SERPL-MCNC: 4.3 G/DL (ref 3.5–5.2)
ALBUMIN/GLOB SERPL: 1.3 G/DL
ALP SERPL-CCNC: 187 U/L (ref 39–117)
ALT SERPL W P-5'-P-CCNC: 158 U/L (ref 1–41)
AMYLASE SERPL-CCNC: 76 U/L (ref 28–100)
ANION GAP SERPL CALCULATED.3IONS-SCNC: 12.2 MMOL/L (ref 5–15)
AST SERPL-CCNC: 293 U/L (ref 1–40)
BASOPHILS # BLD AUTO: 0.04 10*3/MM3 (ref 0–0.2)
BASOPHILS NFR BLD AUTO: 0.7 % (ref 0–1.5)
BILIRUB SERPL-MCNC: 0.5 MG/DL (ref 0–1.2)
BUN SERPL-MCNC: 5 MG/DL (ref 6–20)
BUN/CREAT SERPL: 6.7 (ref 7–25)
CALCIUM SPEC-SCNC: 8.7 MG/DL (ref 8.6–10.5)
CHLORIDE SERPL-SCNC: 98 MMOL/L (ref 98–107)
CO2 SERPL-SCNC: 26.8 MMOL/L (ref 22–29)
CREAT SERPL-MCNC: 0.75 MG/DL (ref 0.76–1.27)
DEPRECATED RDW RBC AUTO: 47.2 FL (ref 37–54)
EOSINOPHIL # BLD AUTO: 0.09 10*3/MM3 (ref 0–0.4)
EOSINOPHIL NFR BLD AUTO: 1.6 % (ref 0.3–6.2)
ERYTHROCYTE [DISTWIDTH] IN BLOOD BY AUTOMATED COUNT: 12.7 % (ref 12.3–15.4)
GFR SERPL CREATININE-BSD FRML MDRD: 109 ML/MIN/1.73
GLOBULIN UR ELPH-MCNC: 3.2 GM/DL
GLUCOSE SERPL-MCNC: 93 MG/DL (ref 65–99)
HCT VFR BLD AUTO: 47.9 % (ref 37.5–51)
HGB BLD-MCNC: 16.4 G/DL (ref 13–17.7)
IMM GRANULOCYTES # BLD AUTO: 0.02 10*3/MM3 (ref 0–0.05)
IMM GRANULOCYTES NFR BLD AUTO: 0.4 % (ref 0–0.5)
LIPASE SERPL-CCNC: 31 U/L (ref 13–60)
LYMPHOCYTES # BLD AUTO: 1.76 10*3/MM3 (ref 0.7–3.1)
LYMPHOCYTES NFR BLD AUTO: 31.4 % (ref 19.6–45.3)
MCH RBC QN AUTO: 34.1 PG (ref 26.6–33)
MCHC RBC AUTO-ENTMCNC: 34.2 G/DL (ref 31.5–35.7)
MCV RBC AUTO: 99.6 FL (ref 79–97)
MONOCYTES # BLD AUTO: 0.65 10*3/MM3 (ref 0.1–0.9)
MONOCYTES NFR BLD AUTO: 11.6 % (ref 5–12)
NEUTROPHILS NFR BLD AUTO: 3.04 10*3/MM3 (ref 1.7–7)
NEUTROPHILS NFR BLD AUTO: 54.3 % (ref 42.7–76)
PLATELET # BLD AUTO: 258 10*3/MM3 (ref 140–450)
PMV BLD AUTO: 8.7 FL (ref 6–12)
POTASSIUM SERPL-SCNC: 4.4 MMOL/L (ref 3.5–5.2)
PROT SERPL-MCNC: 7.5 G/DL (ref 6–8.5)
RBC # BLD AUTO: 4.81 10*6/MM3 (ref 4.14–5.8)
SODIUM SERPL-SCNC: 137 MMOL/L (ref 136–145)
WBC # BLD AUTO: 5.6 10*3/MM3 (ref 3.4–10.8)

## 2021-06-09 PROCEDURE — 85025 COMPLETE CBC W/AUTO DIFF WBC: CPT

## 2021-06-09 PROCEDURE — 80053 COMPREHEN METABOLIC PANEL: CPT

## 2021-06-09 PROCEDURE — 83690 ASSAY OF LIPASE: CPT

## 2021-06-09 PROCEDURE — 99214 OFFICE O/P EST MOD 30 MIN: CPT | Performed by: NURSE PRACTITIONER

## 2021-06-09 PROCEDURE — 82150 ASSAY OF AMYLASE: CPT

## 2021-06-09 PROCEDURE — 36415 COLL VENOUS BLD VENIPUNCTURE: CPT

## 2021-06-09 NOTE — ASSESSMENT & PLAN NOTE
If the symptoms that occurred last night reoccur, go to ER. Will check some labs and consider further evaluation

## 2021-06-09 NOTE — TELEPHONE ENCOUNTER
Talked to wife, advised her he should have went to the ER last night. Appt moved up for today. If this happens again go to ER.

## 2021-06-09 NOTE — TELEPHONE ENCOUNTER
Hub staff attempted to follow warm transfer process and was unsuccessful     Caller: JEFF    Relationship to patient: Spouse    Best call back number: [phone]    Chief complaint: LETHARGIC    Type of visit:   OFFICE    Requested date: 06/10-11/2021    If rescheduling, when is the original appointment: 06/16/2021    Additional notes: THE PATIENT'S WIFE STATED THE PATIENT HAS BEEN HAVING EPISODES AND THE PCP IS AWARE OF IT, BUT ON 06/08/2021 EVENING HE WAS  LETHARGIC, STUMBLING INTO WALLS AND DOORS, COULD BARELY SPEAK, AND HIS EYES WERE GLAZED. SHE STATED AS OF 06/09/2021 HE SEEMS PERFECTLY FINE WITH NO MEMORY OF ANY OF THESE EVENTS. SHE WOULD LIKE TO HAVE HIS PCP SEE HIM SOONER THAN 06/16/2021. SHE WOULD LIKE A CALL BACK ASAP TO DISCUSS AND TO CHECK ON THE REFERRAL FOR THE PATIENT TO THE LIVER SPECIALIST.

## 2021-06-09 NOTE — ASSESSMENT & PLAN NOTE
He is still waiting a referral appt with [facility] Wolfe, advised to stop drinking ETOH, wife is going to reach out to [facility] herself.  Reviewed his hepatitis panel was negative as well, reviewed all labs, weight and vitals; discussed covid vaccines, he is not interested at this time

## 2021-06-09 NOTE — PROGRESS NOTES
Alexandr Willis presents to [facility].    Chief Complaint:  Follow-up (pt's wife states he has been having episodes of lathargic, not able to stand, talk, wife states it was like he was drunk off alcohol )         History of Present Illness:  Wife comes in with Natasha today.  He had an episode last night that has really gotten her concerned.  It was the worse episode that has occurred since they first started to notice the change.  He started to work from home with covid after March 2020.  Then in 9-2020 he started having some vague symptoms, weakness, lethargic, falls, lightheaded, not eating.  Symptoms worse in the last 1-2 months.  Last night was the worse episode where she found him at home, he had not eaten all day.  Thought he was drunk, he denied, he was falling, confused and it was not until he drank some orange juice that his symptoms seem to resolve.  He went on to bed and has been okay today.  He does drink 1-4 beers a night, but not every night.  Denies drinking beer last night.        Review of Systems:  Review of Systems   Constitutional: Negative for fatigue and fever.   Respiratory: Negative for cough and shortness of breath.    Cardiovascular: Negative for chest pain, palpitations and leg swelling.   Gastrointestinal: Positive for diarrhea and GERD. Negative for abdominal pain, blood in stool, constipation, nausea and vomiting.        Mild reflux occ.  + for anorexia; + elevated LFT's    Musculoskeletal: Positive for back pain.        Right mid to lower back pain, mild, did move some thing a little heavy recently    Neurological: Negative for numbness.        Medical History:  Past Medical History:   • Abnormal results of liver function studies   • Anxiety with depression   • Arthritis   • Chronic prostatitis   • Herpes simplex    Cold sore   • Herpesviral infection, unspecified   • Hydrocele    pt states the left!   • Hydrocele, left   • Leg pain   • Other amnesia   •  Pain in joint involving multiple sites   • Prostatitis   • Prostatitis   • Sciatica, left side   • Sleep apnea    test at age 20s showed mild apnea, no machine     Past Surgical History:   • COLONOSCOPY   • EPIDIDYMAL CYST EXCISION   • HYDROCELECTOMY    Procedure: LEFT HYDROCELECTOMY UNILATERAL, EXCISION OF EPIDYDIMAL CYST;  Surgeon: Chago Teran MD;  Location: [facility];  Service: Urology   • KNEE ARTHROPLASTY, PARTIAL REPLACEMENT    two scopes prior to replacement   • PROSTATE BIOPSY   • PTERYGIUM EXCISION W/ GRAFT   • REPLACEMENT TOTAL KNEE    BIOMET      Family History   Problem Relation Age of Onset   • Rheum arthritis Mother    • Colon cancer Father    • Arthritis Sister    • Alzheimer's disease Maternal Grandmother    • Cerebral aneurysm Maternal Grandfather    • Malig Hyperthermia Neg Hx      Social History     Tobacco Use   • Smoking status: Current Some Day Smoker     Types: Cigars   • Smokeless tobacco: Former User     Types: Chew   Substance Use Topics   • Alcohol use: Yes     Alcohol/week: 10.0 standard drinks     Types: 10 Cans of beer per week     Comment: 2 days/weeks       Health Maintenance Due   Topic Date Due   • ANNUAL PHYSICAL  Never done   • Pneumococcal Vaccine 0-64 (1 of 1 - PPSV23) Never done   • COVID-19 Vaccine (1) Never done   • TDAP/TD VACCINES (1 - Tdap) Never done   • ZOSTER VACCINE (1 of 2) Never done        Immunization History   Administered Date(s) Administered   • Flulaval/Fluarix/Fluzone Quad 09/14/2016   • Influenza Quad Vaccine (Inpatient) 10/13/2017       No Known Allergies     Medications:  Current Outpatient Medications on File Prior to Visit   Medication Sig   • celecoxib (CeleBREX) 200 MG capsule Take 200 mg by mouth Daily As Needed for Mild Pain . Stopped preop, last dose 5/2018   • ibuprofen (ADVIL,MOTRIN) 400 MG tablet Take 400 mg by mouth Every 6 (Six) Hours As Needed for Mild Pain . Stopped preop, last dose 6/18/18   • fluticasone (FLONASE) 50 MCG/ACT  nasal spray 2 sprays into each nostril Daily.   • HYDROcodone-acetaminophen (NORCO) 7.5-325 MG per tablet Take 1-2 tablets by mouth Every 4 (Four) Hours As Needed for Moderate Pain  (Pain).   • valACYclovir (VALTREX) 500 MG tablet Take 500 mg by mouth As Needed (feverblisters).     No current facility-administered medications on file prior to visit.       Vital Signs:   /96 (BP Location: Left arm, Patient Position: Sitting, Cuff Size: Adult)   Pulse 77   Temp 97.4 °F (36.3 °C) (Oral)   Wt 70.2 kg (154 lb 12.8 oz)   BMI 22.86 kg/m²       Physical Exam:  Physical Exam  Vitals reviewed.   Constitutional:       General: He is not in acute distress.     Comments: Thin    Eyes:      Extraocular Movements: Extraocular movements intact.      Pupils: Pupils are equal, round, and reactive to light.   Neck:      Vascular: No carotid bruit.   Cardiovascular:      Rate and Rhythm: Normal rate and regular rhythm.      Heart sounds: No murmur heard.     Pulmonary:      Effort: Pulmonary effort is normal. No respiratory distress.      Breath sounds: Normal breath sounds.   Abdominal:      Palpations: Abdomen is soft. There is no mass.      Tenderness: There is no abdominal tenderness.   Musculoskeletal:      Right lower leg: No edema.      Left lower leg: No edema.      Comments: Gait normal, full ROM Of back    Neurological:      Mental Status: He is alert.      Comments: Grossly intact    Psychiatric:         Mood and Affect: Mood normal.         Behavior: Behavior normal.         Result Review      The following data was reviewed by: OSWALDO White on 06/09/2021: reviewed labs from last month and liver ultrasound, fatty liver     No results found for: HGBA1C            Assessment and Plan:          Diagnoses and all orders for this visit:    1. Elevated blood pressure reading (Primary)  Assessment & Plan:  Recheck bp     Orders:  -     Comprehensive Metabolic Panel; Future    2. Dizziness and  giddiness  Assessment & Plan:  If the symptoms that occurred last night reoccur, go to ER. Will check some labs and consider further evaluation     Orders:  -     CBC & Differential; Future    3. Abnormal liver function test  Assessment & Plan:  He is still waiting a referral appt with [facility], advised to stop drinking ETOH, wife is going to reach out to [facility] herself.  Reviewed his hepatitis panel was negative as well, reviewed all labs, weight and vitals; discussed covid vaccines, he is not interested at this time     Orders:  -     Lipase; Future  -     Amylase; Future      Follow Up   Return for followup pending lab results.  Patient was given instructions and counseling regarding his condition or for health maintenance advice. Please see specific information pulled into the AVS if appropriate.

## 2021-06-14 ENCOUNTER — TELEPHONE (OUTPATIENT)
Dept: FAMILY MEDICINE CLINIC | Age: 51
End: 2021-06-14

## 2021-06-14 NOTE — TELEPHONE ENCOUNTER
----- Message from OSWALDO White sent at 6/12/2021 11:07 AM EDT -----  Please call the patient regarding his abnormal result.  His liver test are higher. His amylase and lipase are normal.    See how he is doing and I may need to order a CT of abdomen and then see a specialist regarding his symptoms and abnormal liver test.

## 2021-06-15 NOTE — TELEPHONE ENCOUNTER
Caller: Alexandr Willis    Relationship: Self    Best call back number: [phone]    Do you know the name of the person who called: MICHAEL    What was the call regarding: LAB WORK, LIVER    Do you require a callback: YES

## 2021-06-15 NOTE — TELEPHONE ENCOUNTER
Pt inf of lab, he said he is not having any problems. Still waiting on Gastro referral. Refaxed referral form.

## 2021-06-16 ENCOUNTER — TELEPHONE (OUTPATIENT)
Dept: GASTROENTEROLOGY | Facility: CLINIC | Age: 51
End: 2021-06-16

## 2021-06-22 NOTE — TELEPHONE ENCOUNTER
So when is his appt with [facility], even if ordered in old system aren't they making him that referral appt?

## 2021-06-29 NOTE — TELEPHONE ENCOUNTER
Spoke with pt, he states he missed a call last week, tried calling them back, after a few tries he did reach someone but was told that they did not know who called him and would check with scheduling and pt has not heard back and that will be a week tomorrow, told pt I would check on this for him.

## 2021-06-29 NOTE — TELEPHONE ENCOUNTER
Called Gastro Dr Thompson, to see if I can get an appt for pt, had to leave a message with scheduling.

## 2021-07-01 VITALS
HEIGHT: 69 IN | BODY MASS INDEX: 20.37 KG/M2 | WEIGHT: 137.5 LBS | SYSTOLIC BLOOD PRESSURE: 131 MMHG | HEART RATE: 87 BPM | TEMPERATURE: 97 F | DIASTOLIC BLOOD PRESSURE: 80 MMHG

## 2021-07-01 VITALS
BODY MASS INDEX: 20.75 KG/M2 | SYSTOLIC BLOOD PRESSURE: 146 MMHG | HEART RATE: 83 BPM | DIASTOLIC BLOOD PRESSURE: 92 MMHG | WEIGHT: 140.1 LBS | TEMPERATURE: 97.4 F | HEIGHT: 69 IN

## 2021-07-02 VITALS
DIASTOLIC BLOOD PRESSURE: 93 MMHG | BODY MASS INDEX: 23.99 KG/M2 | WEIGHT: 162 LBS | HEIGHT: 69 IN | SYSTOLIC BLOOD PRESSURE: 138 MMHG | HEART RATE: 80 BPM | TEMPERATURE: 98 F

## 2023-03-13 ENCOUNTER — OFFICE VISIT (OUTPATIENT)
Dept: FAMILY MEDICINE CLINIC | Age: 53
End: 2023-03-13
Payer: COMMERCIAL

## 2023-03-13 VITALS
OXYGEN SATURATION: 98 % | RESPIRATION RATE: 17 BRPM | BODY MASS INDEX: 21.12 KG/M2 | DIASTOLIC BLOOD PRESSURE: 72 MMHG | HEART RATE: 85 BPM | SYSTOLIC BLOOD PRESSURE: 114 MMHG | WEIGHT: 142.6 LBS | HEIGHT: 69 IN

## 2023-03-13 DIAGNOSIS — Z00.00 ROUTINE GENERAL MEDICAL EXAMINATION AT A HEALTH CARE FACILITY: Primary | ICD-10-CM

## 2023-03-13 PROCEDURE — 99396 PREV VISIT EST AGE 40-64: CPT | Performed by: NURSE PRACTITIONER

## 2023-03-13 RX ORDER — DOXYCYCLINE HYCLATE 100 MG/1
1 CAPSULE ORAL EVERY 12 HOURS SCHEDULED
COMMUNITY
Start: 2023-02-22

## 2023-03-13 RX ORDER — METRONIDAZOLE 7.5 MG/G
GEL TOPICAL
COMMUNITY
Start: 2023-02-22

## 2023-03-13 NOTE — ASSESSMENT & PLAN NOTE
Last labs , reviewed chart, he will return in one week fasting for labs; Follow a healthy diet and get regular exercise.  Always wear seat belts.    Advised covid booster declined.  Reviewed EMD chart, did not find a Tdap vaccine.    Congratulated him on smoking and alcohol cessation   Advised he may need EGD, discussed screening CLN may be due later this year.  Will get his labs in one week.

## 2023-03-13 NOTE — PROGRESS NOTES
Alexandr Willis presents to [facility].    Chief Complaint:  Annual Exam (Reports feels like something in throat, reports intermittently )         History of Present Illness:  General Health Questions  Regular exercise as least 3 days a week: no   Follows a healthy diet: tries   Wears seat belt always: yes   Drinks Alcohol: none / quit < 1 month ago   Uses Recreational drugs: none   Uses tobacco products: none quit few months ago   UTD on Tetanus vaccine: maybe 8 years ago  Last colon screen:2018  LFT's have been up in the past      Past Medical History changes since :       Has acne rosacea and on doxy  covid +  after going on cruise     Positive for    Prostatitis; /Dr David Teran, [facility]         PREVENTIVE HEALTH MAINTENANCE             COLORECTAL CANCER SCREENING: Up to date (colonoscopy q10y; sigmoidoscopy q5y; Cologuard q3y) was last done 18, Results are in chart; colonoscopy with normal results; The next colonoscopy is due           Surgical History:         Arthroscopy: knee left X 2;     Biopsy of prostate     Biomet Knee replacement 2010, left;    hemorrhoid banding; Procedures: colonoscopy      Procedures: epididymal cysts 18         Family History:     Father:  at age 74; Cause of death was colon cancer     Mother:  at age 60's;  Rheumatoid Arthritis     Brother(s): Healthy; 1 brother(s) total     Sister(s): 1 sister(s) total;  arthritis     Paternal Grandfather: Cause of death was cerebral aneurysm     Paternal Grandmother: ;  Alzheimer's Disease     Maternal Grandfather:      Maternal Grandmother:  at age 80's         Social History:     Occupation: "[employer]o   Was called Natalia     Marital Status:  ( and remarried)  again but dates her     Children: 2 children           Tobacco: used to smoke Cigars.        Quit drinking ETOH         Review of  "Systems:  Review of Systems   Constitutional: Negative for fatigue and fever.   HENT: Negative for ear pain and sore throat.    Eyes: Negative for blurred vision.   Respiratory: Negative for cough and shortness of breath.    Cardiovascular: Negative for chest pain, palpitations and leg swelling.   Gastrointestinal: Negative for abdominal pain, constipation, diarrhea, nausea and vomiting.        Difficulty swallowing at times / not had in 2 weeks    Musculoskeletal: Negative for arthralgias and myalgias.   Skin: Negative for rash.   Neurological: Negative for dizziness, weakness and headache.   Psychiatric/Behavioral: Negative for sleep disturbance and depressed mood.          Current Outpatient Medications:   •  celecoxib (CeleBREX) 200 MG capsule, Take 1 capsule by mouth Daily As Needed for Mild Pain. Stopped preop, last dose 5/2018, Disp: , Rfl:   •  doxycycline (VIBRAMYCIN) 100 MG capsule, Take 1 capsule by mouth Every 12 (Twelve) Hours., Disp: , Rfl:   •  fluticasone (FLONASE) 50 MCG/ACT nasal spray, 2 sprays into the nostril(s) as directed by provider Daily., Disp: , Rfl:   •  ibuprofen (ADVIL,MOTRIN) 400 MG tablet, Take 1 tablet by mouth Every 6 (Six) Hours As Needed for Mild Pain. Stopped preop, last dose 6/18/18, Disp: , Rfl:   •  metroNIDAZOLE (METROGEL) 0.75 % gel, APPLY THIN LAYER TOPICALLY TO AFFECTED AREA ONCE DAILY, Disp: , Rfl:   •  valACYclovir (VALTREX) 500 MG tablet, Take 1 tablet by mouth As Needed (feverblisters)., Disp: , Rfl:     Vital Signs:   Vitals:    03/13/23 1047   BP: 114/72   BP Location: Right arm   Patient Position: Sitting   Cuff Size: Adult   Pulse: 85   Resp: 17   SpO2: 98%  Comment: room air   Weight: 64.7 kg (142 lb 9.6 oz)   Height: 175.3 cm (69\")         Physical Exam:  Physical Exam  Vitals reviewed.   Constitutional:       Appearance: Normal appearance. He is well-developed.      Comments: Thin    HENT:      Head: Normocephalic and atraumatic.      Right Ear: External ear " normal.      Left Ear: External ear normal.      Mouth/Throat:      Pharynx: No oropharyngeal exudate.   Eyes:      Conjunctiva/sclera: Conjunctivae normal.      Pupils: Pupils are equal, round, and reactive to light.   Cardiovascular:      Rate and Rhythm: Normal rate and regular rhythm.      Heart sounds: No murmur heard.    No friction rub. No gallop.   Pulmonary:      Effort: Pulmonary effort is normal.      Breath sounds: Normal breath sounds. No wheezing or rhonchi.   Abdominal:      General: Bowel sounds are normal. There is no distension.      Palpations: Abdomen is soft.      Tenderness: There is no abdominal tenderness.   Skin:     General: Skin is warm and dry.   Neurological:      Mental Status: He is alert and oriented to person, place, and time.      Cranial Nerves: No cranial nerve deficit.   Psychiatric:         Mood and Affect: Mood and affect normal.         Behavior: Behavior normal.         Thought Content: Thought content normal.         Judgment: Judgment normal.         Result Review      The following data was reviewed by: OSWALDO White on 03/13/2023:    Results for orders placed or performed in visit on 06/09/21   Comprehensive Metabolic Panel    Specimen: Blood   Result Value Ref Range    Glucose 93 65 - 99 mg/dL    BUN 5 (L) 6 - 20 mg/dL    Creatinine 0.75 (L) 0.76 - 1.27 mg/dL    Sodium 137 136 - 145 mmol/L    Potassium 4.4 3.5 - 5.2 mmol/L    Chloride 98 98 - 107 mmol/L    CO2 26.8 22.0 - 29.0 mmol/L    Calcium 8.7 8.6 - 10.5 mg/dL    Total Protein 7.5 6.0 - 8.5 g/dL    Albumin 4.30 3.50 - 5.20 g/dL    ALT (SGPT) 158 (H) 1 - 41 U/L    AST (SGOT) 293 (H) 1 - 40 U/L    Alkaline Phosphatase 187 (H) 39 - 117 U/L    Total Bilirubin 0.5 0.0 - 1.2 mg/dL    eGFR Non African Amer 109 >60 mL/min/1.73    Globulin 3.2 gm/dL    A/G Ratio 1.3 g/dL    BUN/Creatinine Ratio 6.7 (L) 7.0 - 25.0    Anion Gap 12.2 5.0 - 15.0 mmol/L   Lipase    Specimen: Blood   Result Value Ref Range    Lipase  31 13 - 60 U/L   Amylase    Specimen: Blood   Result Value Ref Range    Amylase 76 28 - 100 U/L   CBC Auto Differential    Specimen: Blood   Result Value Ref Range    WBC 5.60 3.40 - 10.80 10*3/mm3    RBC 4.81 4.14 - 5.80 10*6/mm3    Hemoglobin 16.4 13.0 - 17.7 g/dL    Hematocrit 47.9 37.5 - 51.0 %    MCV 99.6 (H) 79.0 - 97.0 fL    MCH 34.1 (H) 26.6 - 33.0 pg    MCHC 34.2 31.5 - 35.7 g/dL    RDW 12.7 12.3 - 15.4 %    RDW-SD 47.2 37.0 - 54.0 fl    MPV 8.7 6.0 - 12.0 fL    Platelets 258 140 - 450 10*3/mm3    Neutrophil % 54.3 42.7 - 76.0 %    Lymphocyte % 31.4 19.6 - 45.3 %    Monocyte % 11.6 5.0 - 12.0 %    Eosinophil % 1.6 0.3 - 6.2 %    Basophil % 0.7 0.0 - 1.5 %    Immature Grans % 0.4 0.0 - 0.5 %    Neutrophils, Absolute 3.04 1.70 - 7.00 10*3/mm3    Lymphocytes, Absolute 1.76 0.70 - 3.10 10*3/mm3    Monocytes, Absolute 0.65 0.10 - 0.90 10*3/mm3    Eosinophils, Absolute 0.09 0.00 - 0.40 10*3/mm3    Basophils, Absolute 0.04 0.00 - 0.20 10*3/mm3    Immature Grans, Absolute 0.02 0.00 - 0.05 10*3/mm3               Assessment and Plan:          Diagnoses and all orders for this visit:    1. Routine general medical examination at a health care facility (Primary)  Assessment & Plan:  Last labs , reviewed chart, he will return in one week fasting for labs; Follow a healthy diet and get regular exercise.  Always wear seat belts.    Advised covid booster declined.  Reviewed EMD chart, did not find a Tdap vaccine.    Congratulated him on smoking and alcohol cessation   Advised he may need EGD, discussed screening CLN may be due later this year.  Will get his labs in one week.      Orders:  -     Comprehensive Metabolic Panel; Future  -     CBC & Differential; Future  -     TSH; Future  -     Lipid Panel; Future        Follow Up   Return for fasting for labs.  Patient was given instructions and counseling regarding his condition or for health maintenance advice. Please see specific information pulled into the AVS if  appropriate.

## 2023-03-17 ENCOUNTER — LAB (OUTPATIENT)
Dept: LAB | Facility: HOSPITAL | Age: 53
End: 2023-03-17
Payer: COMMERCIAL

## 2023-03-17 DIAGNOSIS — Z00.00 ROUTINE GENERAL MEDICAL EXAMINATION AT A HEALTH CARE FACILITY: ICD-10-CM

## 2023-03-17 LAB
ALBUMIN SERPL-MCNC: 3.6 G/DL (ref 3.5–5.2)
ALBUMIN/GLOB SERPL: 1.2 G/DL
ALP SERPL-CCNC: 83 U/L (ref 39–117)
ALT SERPL W P-5'-P-CCNC: 35 U/L (ref 1–41)
ANION GAP SERPL CALCULATED.3IONS-SCNC: 11.4 MMOL/L (ref 5–15)
AST SERPL-CCNC: 34 U/L (ref 1–40)
BASOPHILS # BLD AUTO: 0.06 10*3/MM3 (ref 0–0.2)
BASOPHILS NFR BLD AUTO: 0.8 % (ref 0–1.5)
BILIRUB SERPL-MCNC: 0.3 MG/DL (ref 0–1.2)
BUN SERPL-MCNC: 4 MG/DL (ref 6–20)
BUN/CREAT SERPL: 4.8 (ref 7–25)
CALCIUM SPEC-SCNC: 9.8 MG/DL (ref 8.6–10.5)
CHLORIDE SERPL-SCNC: 106 MMOL/L (ref 98–107)
CHOLEST SERPL-MCNC: 191 MG/DL (ref 0–200)
CO2 SERPL-SCNC: 24.6 MMOL/L (ref 22–29)
CREAT SERPL-MCNC: 0.83 MG/DL (ref 0.76–1.27)
DEPRECATED RDW RBC AUTO: 49.2 FL (ref 37–54)
EGFRCR SERPLBLD CKD-EPI 2021: 104 ML/MIN/1.73
EOSINOPHIL # BLD AUTO: 0.13 10*3/MM3 (ref 0–0.4)
EOSINOPHIL NFR BLD AUTO: 1.7 % (ref 0.3–6.2)
ERYTHROCYTE [DISTWIDTH] IN BLOOD BY AUTOMATED COUNT: 12.8 % (ref 12.3–15.4)
GLOBULIN UR ELPH-MCNC: 3.1 GM/DL
GLUCOSE SERPL-MCNC: 103 MG/DL (ref 65–99)
HCT VFR BLD AUTO: 43.2 % (ref 37.5–51)
HDLC SERPL-MCNC: 56 MG/DL (ref 40–60)
HGB BLD-MCNC: 14.9 G/DL (ref 13–17.7)
IMM GRANULOCYTES # BLD AUTO: 0.01 10*3/MM3 (ref 0–0.05)
IMM GRANULOCYTES NFR BLD AUTO: 0.1 % (ref 0–0.5)
LDLC SERPL CALC-MCNC: 124 MG/DL (ref 0–100)
LDLC/HDLC SERPL: 2.19 {RATIO}
LYMPHOCYTES # BLD AUTO: 2.4 10*3/MM3 (ref 0.7–3.1)
LYMPHOCYTES NFR BLD AUTO: 32 % (ref 19.6–45.3)
MCH RBC QN AUTO: 35.5 PG (ref 26.6–33)
MCHC RBC AUTO-ENTMCNC: 34.5 G/DL (ref 31.5–35.7)
MCV RBC AUTO: 102.9 FL (ref 79–97)
MONOCYTES # BLD AUTO: 0.76 10*3/MM3 (ref 0.1–0.9)
MONOCYTES NFR BLD AUTO: 10.1 % (ref 5–12)
NEUTROPHILS NFR BLD AUTO: 4.14 10*3/MM3 (ref 1.7–7)
NEUTROPHILS NFR BLD AUTO: 55.3 % (ref 42.7–76)
PLATELET # BLD AUTO: 330 10*3/MM3 (ref 140–450)
PMV BLD AUTO: 9.4 FL (ref 6–12)
POTASSIUM SERPL-SCNC: 4.2 MMOL/L (ref 3.5–5.2)
PROT SERPL-MCNC: 6.7 G/DL (ref 6–8.5)
RBC # BLD AUTO: 4.2 10*6/MM3 (ref 4.14–5.8)
SODIUM SERPL-SCNC: 142 MMOL/L (ref 136–145)
TRIGL SERPL-MCNC: 61 MG/DL (ref 0–150)
TSH SERPL DL<=0.05 MIU/L-ACNC: 13.2 UIU/ML (ref 0.27–4.2)
VLDLC SERPL-MCNC: 11 MG/DL (ref 5–40)
WBC NRBC COR # BLD: 7.5 10*3/MM3 (ref 3.4–10.8)

## 2023-03-17 PROCEDURE — 80061 LIPID PANEL: CPT

## 2023-03-17 PROCEDURE — 85025 COMPLETE CBC W/AUTO DIFF WBC: CPT

## 2023-03-17 PROCEDURE — 84443 ASSAY THYROID STIM HORMONE: CPT

## 2023-03-17 PROCEDURE — 36415 COLL VENOUS BLD VENIPUNCTURE: CPT

## 2023-03-17 PROCEDURE — 80053 COMPREHEN METABOLIC PANEL: CPT

## 2023-04-03 ENCOUNTER — OFFICE VISIT (OUTPATIENT)
Dept: FAMILY MEDICINE CLINIC | Age: 53
End: 2023-04-03
Payer: COMMERCIAL

## 2023-04-03 VITALS
HEIGHT: 69 IN | HEART RATE: 86 BPM | BODY MASS INDEX: 21 KG/M2 | RESPIRATION RATE: 16 BRPM | SYSTOLIC BLOOD PRESSURE: 119 MMHG | WEIGHT: 141.8 LBS | DIASTOLIC BLOOD PRESSURE: 90 MMHG

## 2023-04-03 DIAGNOSIS — R94.6 ABNORMAL THYROID FUNCTION TEST: Primary | ICD-10-CM

## 2023-04-03 PROBLEM — E03.9 ACQUIRED HYPOTHYROIDISM: Status: ACTIVE | Noted: 2023-04-03

## 2023-04-03 PROBLEM — E03.9 ACQUIRED HYPOTHYROIDISM: Status: RESOLVED | Noted: 2023-04-03 | Resolved: 2023-04-03

## 2023-04-03 PROCEDURE — 99213 OFFICE O/P EST LOW 20 MIN: CPT | Performed by: NURSE PRACTITIONER

## 2023-04-03 NOTE — ASSESSMENT & PLAN NOTE
Reviewed all of his recent labs, will hold off treating thyroid condition, will recheck lab in 2 weeks, discussed hypothyroidism, signs and symptoms   He asked about natural treatments for thyroid condition, discussed possible endo referral

## 2023-04-03 NOTE — PROGRESS NOTES
Alexandr Willis presents to [facility].    Chief Complaint:  Follow-up (Lab results)         History of Present Illness:  Hypothyroidism  Current rx: none   Lab Results       Component                Value               Date                       TSH                      13.200 (H)          2023              Past Medical History changes since 3-:       Has acne rosacea and on doxy  covid +  after going on cruise     Positive for    Prostatitis; /Dr David Teran, allied urology         PREVENTIVE HEALTH MAINTENANCE             COLORECTAL CANCER SCREENING: Up to date (colonoscopy q10y; sigmoidoscopy q5y; Cologuard q3y) was last done 18, Results are in chart; colonoscopy with normal results; The next colonoscopy is due           Surgical History:         Arthroscopy: knee left X 2;     Biopsy of prostate     Biomet Knee replacement 2010, left;    hemorrhoid banding; Procedures: colonoscopy      Procedures: epididymal cysts 18         Family History:     Father:  at age 74; Cause of death was colon cancer     Mother:  at age 60's;  Rheumatoid Arthritis     Brother(s): Healthy; 1 brother(s) total     Sister(s): 1 sister(s) total;  arthritis     Paternal Grandfather: Cause of death was cerebral aneurysm     Paternal Grandmother: ;  Alzheimer's Disease     Maternal Grandfather:      Maternal Grandmother:  at age 80's         Social History:     Occupation: [employer]   Was called Natalia     Marital Status:  ( and remarried)  again but dates her     Children: 2 children           Tobacco: used to smoke Cigars.        Quit drinking ETOH             Review of Systems:  Review of Systems   Constitutional: Positive for fatigue (mild ). Negative for fever.        Feels like he can't gain weight    Respiratory: Negative for cough and shortness of breath.    Cardiovascular:  "Negative for chest pain, palpitations and leg swelling.   Gastrointestinal: Negative for constipation.        No difficulty swallowing    Skin: Negative for dry skin.          Current Outpatient Medications:   •  celecoxib (CeleBREX) 200 MG capsule, Take 1 capsule by mouth Daily As Needed for Mild Pain. Stopped preop, last dose 5/2018, Disp: , Rfl:   •  doxycycline (VIBRAMYCIN) 100 MG capsule, Take 1 capsule by mouth Every 12 (Twelve) Hours., Disp: , Rfl:   •  fluticasone (FLONASE) 50 MCG/ACT nasal spray, 2 sprays into the nostril(s) as directed by provider Daily., Disp: , Rfl:   •  ibuprofen (ADVIL,MOTRIN) 400 MG tablet, Take 1 tablet by mouth Every 6 (Six) Hours As Needed for Mild Pain. Stopped preop, last dose 6/18/18, Disp: , Rfl:   •  metroNIDAZOLE (METROGEL) 0.75 % gel, APPLY THIN LAYER TOPICALLY TO AFFECTED AREA ONCE DAILY, Disp: , Rfl:   •  valACYclovir (VALTREX) 500 MG tablet, Take 1 tablet by mouth As Needed (feverblisters)., Disp: , Rfl:     Vital Signs:   Vitals:    04/03/23 1354   BP: 119/90   BP Location: Right arm   Patient Position: Sitting   Cuff Size: Adult   Pulse: 86   Resp: 16   Weight: 64.3 kg (141 lb 12.8 oz)   Height: 175.3 cm (69\")         Physical Exam:  Physical Exam  Vitals reviewed.   Constitutional:       General: He is not in acute distress.     Appearance: Normal appearance.   Cardiovascular:      Rate and Rhythm: Normal rate and regular rhythm.      Heart sounds: Normal heart sounds. No murmur heard.  Pulmonary:      Effort: Pulmonary effort is normal. No respiratory distress.      Breath sounds: Normal breath sounds.   Musculoskeletal:      Cervical back: Neck supple.   Neurological:      Mental Status: He is alert.   Psychiatric:         Mood and Affect: Mood normal.         Behavior: Behavior normal.         Result Review      The following data was reviewed by: OSWALDO White on 04/03/2023:    Results for orders placed or performed in visit on 03/17/23   Comprehensive " Metabolic Panel    Specimen: Blood   Result Value Ref Range    Glucose 103 (H) 65 - 99 mg/dL    BUN 4 (L) 6 - 20 mg/dL    Creatinine 0.83 0.76 - 1.27 mg/dL    Sodium 142 136 - 145 mmol/L    Potassium 4.2 3.5 - 5.2 mmol/L    Chloride 106 98 - 107 mmol/L    CO2 24.6 22.0 - 29.0 mmol/L    Calcium 9.8 8.6 - 10.5 mg/dL    Total Protein 6.7 6.0 - 8.5 g/dL    Albumin 3.6 3.5 - 5.2 g/dL    ALT (SGPT) 35 1 - 41 U/L    AST (SGOT) 34 1 - 40 U/L    Alkaline Phosphatase 83 39 - 117 U/L    Total Bilirubin 0.3 0.0 - 1.2 mg/dL    Globulin 3.1 gm/dL    A/G Ratio 1.2 g/dL    BUN/Creatinine Ratio 4.8 (L) 7.0 - 25.0    Anion Gap 11.4 5.0 - 15.0 mmol/L    eGFR 104.0 >60.0 mL/min/1.73   TSH    Specimen: Blood   Result Value Ref Range    TSH 13.200 (H) 0.270 - 4.200 uIU/mL   Lipid Panel    Specimen: Blood   Result Value Ref Range    Total Cholesterol 191 0 - 200 mg/dL    Triglycerides 61 0 - 150 mg/dL    HDL Cholesterol 56 40 - 60 mg/dL    LDL Cholesterol  124 (H) 0 - 100 mg/dL    VLDL Cholesterol 11 5 - 40 mg/dL    LDL/HDL Ratio 2.19    CBC Auto Differential    Specimen: Blood   Result Value Ref Range    WBC 7.50 3.40 - 10.80 10*3/mm3    RBC 4.20 4.14 - 5.80 10*6/mm3    Hemoglobin 14.9 13.0 - 17.7 g/dL    Hematocrit 43.2 37.5 - 51.0 %    .9 (H) 79.0 - 97.0 fL    MCH 35.5 (H) 26.6 - 33.0 pg    MCHC 34.5 31.5 - 35.7 g/dL    RDW 12.8 12.3 - 15.4 %    RDW-SD 49.2 37.0 - 54.0 fl    MPV 9.4 6.0 - 12.0 fL    Platelets 330 140 - 450 10*3/mm3    Neutrophil % 55.3 42.7 - 76.0 %    Lymphocyte % 32.0 19.6 - 45.3 %    Monocyte % 10.1 5.0 - 12.0 %    Eosinophil % 1.7 0.3 - 6.2 %    Basophil % 0.8 0.0 - 1.5 %    Immature Grans % 0.1 0.0 - 0.5 %    Neutrophils, Absolute 4.14 1.70 - 7.00 10*3/mm3    Lymphocytes, Absolute 2.40 0.70 - 3.10 10*3/mm3    Monocytes, Absolute 0.76 0.10 - 0.90 10*3/mm3    Eosinophils, Absolute 0.13 0.00 - 0.40 10*3/mm3    Basophils, Absolute 0.06 0.00 - 0.20 10*3/mm3    Immature Grans, Absolute 0.01 0.00 - 0.05 10*3/mm3                Assessment and Plan:          Diagnoses and all orders for this visit:    1. Abnormal thyroid function test (Primary)  Assessment & Plan:  Reviewed all of his recent labs, will hold off treating thyroid condition, will recheck lab in 2 weeks, discussed hypothyroidism, signs and symptoms   He asked about natural treatments for thyroid condition, discussed possible endo referral     Orders:  -     TSH Rfx On Abnormal To Free T4; Future        Follow Up   Return for follow up for labs in 2 weeks .  Patient was given instructions and counseling regarding his condition or for health maintenance advice. Please see specific information pulled into the AVS if appropriate.

## 2023-04-24 ENCOUNTER — LAB (OUTPATIENT)
Dept: LAB | Facility: HOSPITAL | Age: 53
End: 2023-04-24
Payer: COMMERCIAL

## 2023-04-24 DIAGNOSIS — R94.6 ABNORMAL THYROID FUNCTION TEST: ICD-10-CM

## 2023-04-24 LAB
T4 FREE SERPL-MCNC: 0.64 NG/DL (ref 0.93–1.7)
TSH SERPL DL<=0.05 MIU/L-ACNC: 4.68 UIU/ML (ref 0.27–4.2)

## 2023-04-24 PROCEDURE — 84443 ASSAY THYROID STIM HORMONE: CPT

## 2023-04-24 PROCEDURE — 84439 ASSAY OF FREE THYROXINE: CPT

## 2023-04-24 PROCEDURE — 36415 COLL VENOUS BLD VENIPUNCTURE: CPT

## 2023-04-26 DIAGNOSIS — R94.6 ABNORMAL THYROID FUNCTION TEST: Primary | ICD-10-CM

## 2023-07-21 ENCOUNTER — TELEPHONE (OUTPATIENT)
Dept: FAMILY MEDICINE CLINIC | Age: 53
End: 2023-07-21

## 2023-07-21 NOTE — TELEPHONE ENCOUNTER
"    Caller: Alexandr Willis \"ELIDIA\"    Relationship to patient: Self    Best call back number: [phone]    Patient is needing: PATIENT CALLED IN AND SAID ENDOCRINOLOGIST HE WAS REFERRED TO IS RETIRING AND THE NEXT DOCTOR CAN'T GET HIM IN UNTIL JANUARY OF 2024. PATIENT WOULD LIKE ANOTHER REFERRAL AND TO BE SEEN SOONER PLEASE. PATIENT SAID IT IS OKAY TO LEAVE A MESSAGE ON PHONE        "

## 2023-07-21 NOTE — TELEPHONE ENCOUNTER
Patient informed we could do a new referral for Endo and there may not be able to get in to someone any sooner than January 2024. Patient states he would keep the appt he has now .

## 2023-12-07 ENCOUNTER — OFFICE VISIT (OUTPATIENT)
Dept: FAMILY MEDICINE CLINIC | Age: 53
End: 2023-12-07
Payer: COMMERCIAL

## 2023-12-07 VITALS
SYSTOLIC BLOOD PRESSURE: 136 MMHG | DIASTOLIC BLOOD PRESSURE: 95 MMHG | OXYGEN SATURATION: 97 % | WEIGHT: 146 LBS | HEART RATE: 102 BPM | BODY MASS INDEX: 21.62 KG/M2 | HEIGHT: 69 IN | TEMPERATURE: 98.5 F

## 2023-12-07 DIAGNOSIS — J02.9 SORE THROAT: ICD-10-CM

## 2023-12-07 DIAGNOSIS — R94.6 ABNORMAL THYROID FUNCTION TEST: Primary | ICD-10-CM

## 2023-12-07 DIAGNOSIS — Z12.11 SCREEN FOR COLON CANCER: ICD-10-CM

## 2023-12-07 DIAGNOSIS — B00.9 HERPESVIRAL INFECTION, UNSPECIFIED: ICD-10-CM

## 2023-12-07 RX ORDER — VALACYCLOVIR HYDROCHLORIDE 500 MG/1
500 TABLET, FILM COATED ORAL AS NEEDED
Qty: 60 TABLET | Refills: 1 | Status: SHIPPED | OUTPATIENT
Start: 2023-12-07

## 2023-12-07 NOTE — ASSESSMENT & PLAN NOTE
He never got an appt with endo after I referred him last year, he wants one in [place], that is where he works, declined repeat thyroid lab today

## 2023-12-07 NOTE — ASSESSMENT & PLAN NOTE
To check with the [facility] on his throat culture, Rest, increase fluids, follow up if symptoms progress or change   Offered to do covid swab, he declines, to do warm salt water gargles

## 2023-12-07 NOTE — PROGRESS NOTES
Chief Complaint  referral  (Patient wanted referral for colonoscopy. Patient also needs a refill on valacyclovir.)    Subjective          Alexandr Willis presents to [facility]    History of Present Illness    URI  When did symptoms start 6 days ago   Any exposures:girl friend has a URI   Symptoms:sore throat, fatigue   Treatment tried:doxy that he takes for his acne     Family history of colon cancer   Due a follow up screening   Associated symptoms: none   Last CLN: 2018 Dr stone    History of abnormal thyroid lab and never got an appt with endo     History of cold sores / and needs valtrex refilled to [facility]    Past Medical History changes since :       Has acne rosacea and on doxy  covid +  after going on cruise     Positive for    Prostatitis; /Dr David Teran, allied urology         PREVENTIVE HEALTH MAINTENANCE             COLORECTAL CANCER SCREENING: Up to date (colonoscopy q10y; sigmoidoscopy q5y; Cologuard q3y) was last done 18, Results are in chart; colonoscopy with normal results; The next colonoscopy is due           Surgical History:         Arthroscopy: knee left X 2;     Biopsy of prostate     Biomet Knee replacement 2010, left;    hemorrhoid banding; Procedures: colonoscopy      Procedures: epididymal cysts 18         Family History:     Father:  at age 74; Cause of death was colon cancer     Mother:  at age 60's;  Rheumatoid Arthritis     Brother(s): Healthy; 1 brother(s) total     Sister(s): 1 sister(s) total;  arthritis     Paternal Grandfather: Cause of death was cerebral aneurysm     Paternal Grandmother: ;  Alzheimer's Disease     Maternal Grandfather:      Maternal Grandmother:  at age 80's         Social History:     Occupation: [employer]   Was called Natalia     Marital Status:  ( and remarried)  again but dates her     Children: 2  children           Past Medical History:   Diagnosis Date    Abnormal results of liver function studies     Anxiety with depression     Arthritis     Chronic prostatitis     Herpes simplex     Cold sore    Herpesviral infection, unspecified 12/7/2023    Hydrocele     pt states the left!    Hydrocele, left 6/27/2018    Leg pain     Other amnesia     Pain in joint involving multiple sites     Prostatitis     Prostatitis     Sciatica, left side     Sleep apnea     test at age 18s showed mild apnea, no machine       No Known Allergies     Past Surgical History:   Procedure Laterality Date    COLONOSCOPY  2009    EPIDIDYMAL CYST EXCISION  06/27/2018    HYDROCELECTOMY Left 6/27/2018    Procedure: LEFT HYDROCELECTOMY UNILATERAL, EXCISION OF EPIDYDIMAL CYST;  Surgeon: Chago Teran MD;  Location: [facility];  Service: Urology    KNEE ARTHROPLASTY, PARTIAL REPLACEMENT Left     two scopes prior to replacement    PROSTATE BIOPSY      PTERYGIUM EXCISION W/ GRAFT Right     REPLACEMENT TOTAL KNEE Left 12/13/2010    BIOMET        Social History     Tobacco Use    Smoking status: Former     Types: Cigars     Passive exposure: Never    Smokeless tobacco: Former     Types: Chew   Substance Use Topics    Alcohol use: Not Currently     Alcohol/week: 10.0 standard drinks of alcohol     Types: 10 Cans of beer per week     Comment: 2 days/weeks       Family History   Problem Relation Age of Onset    Rheum arthritis Mother     Colon cancer Father     Arthritis Sister     Alzheimer's disease Maternal Grandmother     Cerebral aneurysm Maternal Grandfather     Malig Hyperthermia Neg Hx         Health Maintenance Due   Topic Date Due    TDAP/TD VACCINES (1 - Tdap) Never done    ZOSTER VACCINE (1 of 2) Never done        Current Outpatient Medications on File Prior to Visit   Medication Sig    celecoxib (CeleBREX) 200 MG capsule Take 1 capsule by mouth Daily As Needed for Mild Pain. Stopped preop, last dose 5/2018    doxycycline  "(VIBRAMYCIN) 100 MG capsule Take 1 capsule by mouth Every 12 (Twelve) Hours.    fluticasone (FLONASE) 50 MCG/ACT nasal spray 2 sprays into the nostril(s) as directed by provider Daily.    ibuprofen (ADVIL,MOTRIN) 400 MG tablet Take 1 tablet by mouth Every 6 (Six) Hours As Needed for Mild Pain. Stopped preop, last dose 6/18/18    metroNIDAZOLE (METROGEL) 0.75 % gel APPLY THIN LAYER TOPICALLY TO AFFECTED AREA ONCE DAILY    [DISCONTINUED] valACYclovir (VALTREX) 500 MG tablet Take 1 tablet by mouth As Needed (feverblisters).     No current facility-administered medications on file prior to visit.       Immunization History   Administered Date(s) Administered    COVID-19 (PFIZER) Purple Cap Monovalent 11/06/2021, 11/28/2021    Fluzone (or Fluarix & Flulaval for VFC) >6mos 09/14/2016    Influenza Quad Vaccine (Inpatient) 10/13/2017       Review of Systems   Constitutional:  Positive for fatigue. Negative for fever.   HENT:  Positive for sore throat.    Respiratory:  Negative for cough and shortness of breath.    Cardiovascular:  Negative for chest pain, palpitations and leg swelling.        Objective     Vitals:    12/07/23 1243   BP: 136/95   BP Location: Right arm   Patient Position: Sitting   Cuff Size: Large Adult   Pulse: 102   Temp: 98.5 °F (36.9 °C)   TempSrc: Oral   SpO2: 97%   Weight: 66.2 kg (146 lb)   Height: 175.3 cm (69\")            Physical Exam  Vitals reviewed.   Constitutional:       General: He is not in acute distress.     Appearance: Normal appearance.   HENT:      Right Ear: Tympanic membrane, ear canal and external ear normal.      Left Ear: Tympanic membrane, ear canal and external ear normal.      Nose: Nose normal.      Mouth/Throat:      Pharynx: Oropharynx is clear. Posterior oropharyngeal erythema present.   Cardiovascular:      Rate and Rhythm: Normal rate and regular rhythm.      Heart sounds: Normal heart sounds. No murmur heard.  Pulmonary:      Effort: Pulmonary effort is normal. No " respiratory distress.      Breath sounds: Normal breath sounds.   Musculoskeletal:      Cervical back: Neck supple.   Lymphadenopathy:      Cervical: No cervical adenopathy.   Neurological:      Mental Status: He is alert.   Psychiatric:         Mood and Affect: Mood normal.         Behavior: Behavior normal.         Result Review :     The following data was reviewed by: OSWALDO White on 12/07/2023:                       Assessment and Plan      Diagnoses and all orders for this visit:    1. Abnormal thyroid function test (Primary)  Assessment & Plan:  He never got an appt with endo after I referred him last year, he wants one in [place], that is where he works, declined repeat thyroid lab today     Orders:  -     Ambulatory Referral to Endocrinology  -     Ambulatory Referral to Endocrinology    2. Herpesviral infection, unspecified  Assessment & Plan:  Refilled valtrex today to take prn     Orders:  -     valACYclovir (VALTREX) 500 MG tablet; Take 1 tablet by mouth As Needed (feverblisters).  Dispense: 60 tablet; Refill: 1    3. Screen for colon cancer  Assessment & Plan:  Sending back to Dr Livingston per pt request    Orders:  -     Ambulatory Referral to General Surgery    4. Sore throat  Assessment & Plan:  To check with the [facility] on his throat culture, Rest, increase fluids, follow up if symptoms progress or change   Offered to do covid swab, he declines, to do warm salt water gargles           BMI is within normal parameters. No other follow-up for BMI required.         Follow Up     Return if symptoms worsen or fail to improve.    Patient was given instructions and counseling regarding his condition or for health maintenance advice. Please see specific information pulled into the AVS if appropriate.

## 2023-12-20 ENCOUNTER — TELEPHONE (OUTPATIENT)
Dept: FAMILY MEDICINE CLINIC | Age: 53
End: 2023-12-20
Payer: COMMERCIAL

## 2023-12-20 DIAGNOSIS — E78.00 HIGH CHOLESTEROL: ICD-10-CM

## 2023-12-20 DIAGNOSIS — R79.89 ABNORMAL THYROID BLOOD TEST: Primary | ICD-10-CM

## 2023-12-20 NOTE — TELEPHONE ENCOUNTER
"  Caller: Alexandr Willis \"ELIDIA\"    Relationship: Self    Best call back number: [phone]     Who are you requesting to speak with (clinical staff, provider,  specific staff member): MEDICAL STAFF    What was the call regarding: PATIENT WAS TOLD LAST VISIT THAT HE NEEDS TO GET LABS DONE AND HE CAN GET THEM AT A LATER TIME. HE WAS NOT FEELING WELL THAT DAY AND HE DID NOT ASK WHAT THE LABS WERE FOR. HE WOULD LIKE TO KNOW WHY HE NEEDS TO GET HIS LABS DONE. PLEASE CALL PATIENT TO ADVISE.  "

## 2023-12-21 ENCOUNTER — LAB (OUTPATIENT)
Dept: LAB | Facility: HOSPITAL | Age: 53
End: 2023-12-21
Payer: COMMERCIAL

## 2023-12-21 LAB
ALBUMIN SERPL-MCNC: 3.8 G/DL (ref 3.5–5.2)
ALBUMIN/GLOB SERPL: 1.3 G/DL
ALP SERPL-CCNC: 84 U/L (ref 39–117)
ALT SERPL W P-5'-P-CCNC: 33 U/L (ref 1–41)
ANION GAP SERPL CALCULATED.3IONS-SCNC: 10.9 MMOL/L (ref 5–15)
AST SERPL-CCNC: 32 U/L (ref 1–40)
BILIRUB SERPL-MCNC: 0.8 MG/DL (ref 0–1.2)
BUN SERPL-MCNC: 7 MG/DL (ref 6–20)
BUN/CREAT SERPL: 9.1 (ref 7–25)
CALCIUM SPEC-SCNC: 9.3 MG/DL (ref 8.6–10.5)
CHLORIDE SERPL-SCNC: 103 MMOL/L (ref 98–107)
CHOLEST SERPL-MCNC: 163 MG/DL (ref 0–200)
CO2 SERPL-SCNC: 26.1 MMOL/L (ref 22–29)
CREAT SERPL-MCNC: 0.77 MG/DL (ref 0.76–1.27)
EGFRCR SERPLBLD CKD-EPI 2021: 105.7 ML/MIN/1.73
GLOBULIN UR ELPH-MCNC: 2.9 GM/DL
GLUCOSE SERPL-MCNC: 93 MG/DL (ref 65–99)
HDLC SERPL-MCNC: 57 MG/DL (ref 40–60)
LDLC SERPL CALC-MCNC: 97 MG/DL (ref 0–100)
LDLC/HDLC SERPL: 1.71 {RATIO}
POTASSIUM SERPL-SCNC: 3.7 MMOL/L (ref 3.5–5.2)
PROT SERPL-MCNC: 6.7 G/DL (ref 6–8.5)
SODIUM SERPL-SCNC: 140 MMOL/L (ref 136–145)
T-UPTAKE NFR SERPL: 1.04 TBI (ref 0.8–1.3)
T4 SERPL-MCNC: 8.35 MCG/DL (ref 4.5–11.7)
TRIGL SERPL-MCNC: 43 MG/DL (ref 0–150)
TSH SERPL DL<=0.05 MIU/L-ACNC: 3.21 UIU/ML (ref 0.27–4.2)
VLDLC SERPL-MCNC: 9 MG/DL (ref 5–40)

## 2023-12-21 PROCEDURE — 84479 ASSAY OF THYROID (T3 OR T4): CPT | Performed by: NURSE PRACTITIONER

## 2023-12-21 PROCEDURE — 84436 ASSAY OF TOTAL THYROXINE: CPT | Performed by: NURSE PRACTITIONER

## 2023-12-21 PROCEDURE — 80061 LIPID PANEL: CPT | Performed by: NURSE PRACTITIONER

## 2023-12-21 PROCEDURE — 36415 COLL VENOUS BLD VENIPUNCTURE: CPT | Performed by: NURSE PRACTITIONER

## 2023-12-21 PROCEDURE — 84443 ASSAY THYROID STIM HORMONE: CPT | Performed by: NURSE PRACTITIONER

## 2023-12-21 PROCEDURE — 80053 COMPREHEN METABOLIC PANEL: CPT | Performed by: NURSE PRACTITIONER

## 2023-12-21 NOTE — TELEPHONE ENCOUNTER
I wanted to repeat his TSH, but his BP was up, LDL has been up, so get a fasting CMP, Lipid and TSH for abnormal thyroid lab, and high cholesterol

## 2024-04-02 ENCOUNTER — OFFICE VISIT (OUTPATIENT)
Dept: ENDOCRINOLOGY | Age: 54
End: 2024-04-02
Payer: COMMERCIAL

## 2024-04-02 VITALS
SYSTOLIC BLOOD PRESSURE: 146 MMHG | TEMPERATURE: 96.9 F | HEIGHT: 69 IN | HEART RATE: 64 BPM | WEIGHT: 151.6 LBS | BODY MASS INDEX: 22.45 KG/M2 | OXYGEN SATURATION: 99 % | DIASTOLIC BLOOD PRESSURE: 90 MMHG

## 2024-04-02 DIAGNOSIS — R79.89 ABNORMAL THYROID BLOOD TEST: Primary | ICD-10-CM

## 2024-04-02 LAB
T4 FREE SERPL-MCNC: 1.12 NG/DL (ref 0.93–1.7)
TSH SERPL DL<=0.005 MIU/L-ACNC: 5.32 UIU/ML (ref 0.27–4.2)

## 2024-04-02 PROCEDURE — 99204 OFFICE O/P NEW MOD 45 MIN: CPT | Performed by: STUDENT IN AN ORGANIZED HEALTH CARE EDUCATION/TRAINING PROGRAM

## 2024-04-02 NOTE — ASSESSMENT & PLAN NOTE
Had abnormal TFT in March 2023 consistent with hypothyroidism  Reports sore throat and flulike symptoms in December 2022  Considering the trend of the TFT and his recent illness in December it was most likely thyroiditis resolves on its own  Repeat TSH was normal in December 2023  Repeat TFT today

## 2024-04-02 NOTE — PROGRESS NOTES
"Chief Complaint  Abnormal thyroid function test    Subjective        Alexandr Willis presents to [facility]  to establish care.     History of Present Illness    Referred  for further management of abnormal thyroid blood test    Patient states that he was sick in December 2022  Had a sore throat and flulike symptoms    3/17/2023  TSH 13.2    4/24/2023     TSH 4.68  Free T40.64      12/21/2023  TSH 3.21             Does not take  steroid or biotin  Denies palpitation  Denies family story of thyroid disease      Component      Latest Ref Rng 4/24/2023 12/21/2023   Glucose      65 - 99 mg/dL  93    BUN      6 - 20 mg/dL  7    Creatinine      0.76 - 1.27 mg/dL  0.77    Sodium      136 - 145 mmol/L  140    Potassium      3.5 - 5.2 mmol/L  3.7    Chloride      98 - 107 mmol/L  103    CO2      22.0 - 29.0 mmol/L  26.1    Calcium      8.6 - 10.5 mg/dL  9.3    Total Protein      6.0 - 8.5 g/dL  6.7    Albumin      3.5 - 5.2 g/dL  3.8    ALT (SGPT)      1 - 41 U/L  33    AST (SGOT)      1 - 40 U/L  32    Alkaline Phosphatase      39 - 117 U/L  84    Total Bilirubin      0.0 - 1.2 mg/dL  0.8    Globulin      gm/dL  2.9    A/G Ratio      g/dL  1.3    BUN/Creatinine Ratio      7.0 - 25.0   9.1    Anion Gap      5.0 - 15.0 mmol/L  10.9    eGFR      >60.0 mL/min/1.73  105.7    Total Cholesterol      0 - 200 mg/dL  163    Triglycerides      0 - 150 mg/dL  43    HDL Cholesterol      40 - 60 mg/dL  57    LDL Cholesterol       0 - 100 mg/dL  97    VLDL Cholesterol      5 - 40 mg/dL  9    LDL/HDL Ratio  1.71    TSH Baseline      0.270 - 4.200 uIU/mL 4.680 (H)  3.210    T Uptake      0.80 - 1.30 TBI  1.04    T4, Total      4.50 - 11.70 mcg/dL  8.35    Free T4      0.93 - 1.70 ng/dL 0.64 (L)        Legend:  (H) High  (L) Low    Objective   Vital Signs:  /90   Pulse 64   Temp 96.9 °F (36.1 °C) (Temporal)   Ht 175.3 cm (69.02\")   Wt 68.8 kg (151 lb 9.6 oz)   SpO2 99%   BMI 22.38 kg/m² " "  Estimated body mass index is 22.38 kg/m² as calculated from the following:    Height as of this encounter: 175.3 cm (69.02\").    Weight as of this encounter: 68.8 kg (151 lb 9.6 oz).       BMI is within normal parameters. No other follow-up for BMI required.      Review of Systems   Constitutional:  Negative for fatigue and unexpected weight change.   Eyes:  Negative for visual disturbance.   Cardiovascular:  Negative for palpitations.   Gastrointestinal:  Negative for constipation.   Neurological:  Positive for dizziness and light-headedness.        Once in a while   When standing up quickly         Physical Exam  Constitutional:       Appearance: Normal appearance.   HENT:      Head: Normocephalic and atraumatic.   Eyes:      Extraocular Movements: Extraocular movements intact.   Cardiovascular:      Rate and Rhythm: Normal rate and regular rhythm.   Pulmonary:      Effort: Pulmonary effort is normal.      Breath sounds: Normal breath sounds. No wheezing.   Abdominal:      Palpations: Abdomen is soft.      Tenderness: There is no abdominal tenderness.   Musculoskeletal:         General: No swelling. Normal range of motion.      Cervical back: Neck supple. No tenderness.   Neurological:      Mental Status: He is alert and oriented to person, place, and time.   Psychiatric:         Mood and Affect: Mood normal.      Result Review :  The following data was reviewed by: Mahrokh Nokhbehzaeim, MD on 04/02/2024:  CMP          12/21/2023    10:14   CMP   Glucose 93    BUN 7    Creatinine 0.77    EGFR 105.7    Sodium 140    Potassium 3.7    Chloride 103    Calcium 9.3    Total Protein 6.7    Albumin 3.8    Globulin 2.9    Total Bilirubin 0.8    Alkaline Phosphatase 84    AST (SGOT) 32    ALT (SGPT) 33    Albumin/Globulin Ratio 1.3    BUN/Creatinine Ratio 9.1    Anion Gap 10.9          Lipid Panel          12/21/2023    10:14   Lipid Panel   Total Cholesterol 163    Triglycerides 43    HDL Cholesterol 57    VLDL " Cholesterol 9    LDL Cholesterol  97    LDL/HDL Ratio 1.71      TSH          4/24/2023    07:11 12/21/2023    10:14   TSH   TSH 4.680  3.210      CMP          12/21/2023    10:14   CMP   Glucose 93    BUN 7    Creatinine 0.77    EGFR 105.7    Sodium 140    Potassium 3.7    Chloride 103    Calcium 9.3    Total Protein 6.7    Albumin 3.8    Globulin 2.9    Total Bilirubin 0.8    Alkaline Phosphatase 84    AST (SGOT) 32    ALT (SGPT) 33    Albumin/Globulin Ratio 1.3    BUN/Creatinine Ratio 9.1    Anion Gap 10.9             Lipid Panel          12/21/2023    10:14   Lipid Panel   Total Cholesterol 163    Triglycerides 43    HDL Cholesterol 57    VLDL Cholesterol 9    LDL Cholesterol  97    LDL/HDL Ratio 1.71          TSH          4/24/2023    07:11 12/21/2023    10:14   TSH   TSH 4.680  3.210       Free T4   Date Value Ref Range Status   04/24/2023 0.64 (L) 0.93 - 1.70 ng/dL Final      25 Hydroxy, Vitamin D   Date Value Ref Range Status   10/29/2021 33 >31 ng/mL Final                   Assessment and Plan   Diagnoses and all orders for this visit:    1. Abnormal thyroid blood test (Primary)  Assessment & Plan:  Had abnormal TFT in March 2023 consistent with hypothyroidism  Reports sore throat and flulike symptoms in December 2022  Considering the trend of the TFT and his recent illness in December it was most likely thyroiditis resolves on its own  Repeat TSH was normal in December 2023  Repeat TFT today    Orders:  -     TSH  -     T4, Free             Follow Up   Return in about 6 months (around 10/2/2024).  Patient was given instructions and counseling regarding his condition or for health maintenance advice. Please see specific information pulled into the AVS if appropriate.

## 2024-04-03 ENCOUNTER — TELEPHONE (OUTPATIENT)
Dept: ENDOCRINOLOGY | Age: 54
End: 2024-04-03
Payer: COMMERCIAL

## 2024-04-03 DIAGNOSIS — E03.8 SUBCLINICAL HYPOTHYROIDISM: ICD-10-CM

## 2024-04-03 DIAGNOSIS — R79.89 ABNORMAL THYROID BLOOD TEST: Primary | ICD-10-CM

## 2024-04-03 NOTE — TELEPHONE ENCOUNTER
Called and discussed the blood test results  Result is consistent with subclinical hypothyroidism  Since TSH is less than 10 treatment is not required  Repeat TFT in 3 months

## 2024-04-04 NOTE — TELEPHONE ENCOUNTER
"Hub staff attempted to follow warm transfer process and was unsuccessful     Caller: Alexandr Willis \"ELIDIA\"    Relationship to patient: Self    Best call back number: [phone]    Patient is needing: PATIENT NEEDS TO SCHEDULE LAB WORK FOR JULY 3. PLEASE CALL PATIENT      "

## 2024-04-05 ENCOUNTER — PATIENT ROUNDING (BHMG ONLY) (OUTPATIENT)
Dept: ENDOCRINOLOGY | Age: 54
End: 2024-04-05
Payer: COMMERCIAL

## 2024-06-13 ENCOUNTER — HOSPITAL ENCOUNTER (EMERGENCY)
Facility: HOSPITAL | Age: 54
Discharge: HOME | End: 2024-06-13
Attending: EMERGENCY MEDICINE
Payer: COMMERCIAL

## 2024-06-13 VITALS
WEIGHT: 157 LBS | RESPIRATION RATE: 16 BRPM | BODY MASS INDEX: 23.25 KG/M2 | DIASTOLIC BLOOD PRESSURE: 106 MMHG | SYSTOLIC BLOOD PRESSURE: 162 MMHG | HEART RATE: 104 BPM | HEIGHT: 69 IN | TEMPERATURE: 98.2 F | OXYGEN SATURATION: 100 %

## 2024-06-13 DIAGNOSIS — K64.4 EXTERNAL HEMORRHOID: Primary | ICD-10-CM

## 2024-06-13 DIAGNOSIS — E87.6 HYPOKALEMIA: ICD-10-CM

## 2024-06-13 DIAGNOSIS — R79.89 ELEVATED LFTS: ICD-10-CM

## 2024-06-13 DIAGNOSIS — K62.5 RECTAL BLEEDING: ICD-10-CM

## 2024-06-13 LAB
ALBUMIN SERPL-MCNC: 4.2 G/DL (ref 3.5–5.2)
ALBUMIN/GLOB SERPL: 1.8 G/DL
ALP SERPL-CCNC: 170 U/L (ref 39–117)
ALT SERPL W P-5'-P-CCNC: 89 U/L (ref 1–41)
ANION GAP SERPL CALCULATED.3IONS-SCNC: 10 MMOL/L (ref 5–15)
AST SERPL-CCNC: 128 U/L (ref 1–40)
BASOPHILS # BLD AUTO: 0.03 10*3/MM3 (ref 0–0.2)
BASOPHILS NFR BLD AUTO: 0.6 % (ref 0–1.5)
BILIRUB SERPL-MCNC: 1 MG/DL (ref 0–1.2)
BUN SERPL-MCNC: 5 MG/DL (ref 6–20)
BUN/CREAT SERPL: 6.7 (ref 7–25)
CALCIUM SPEC-SCNC: 9.3 MG/DL (ref 8.6–10.5)
CHLORIDE SERPL-SCNC: 97 MMOL/L (ref 98–107)
CO2 SERPL-SCNC: 28 MMOL/L (ref 22–29)
CREAT SERPL-MCNC: 0.75 MG/DL (ref 0.76–1.27)
DEPRECATED RDW RBC AUTO: 47 FL (ref 37–54)
EGFRCR SERPLBLD CKD-EPI 2021: 106.6 ML/MIN/1.73
EOSINOPHIL # BLD AUTO: 0.02 10*3/MM3 (ref 0–0.4)
EOSINOPHIL NFR BLD AUTO: 0.4 % (ref 0.3–6.2)
ERYTHROCYTE [DISTWIDTH] IN BLOOD BY AUTOMATED COUNT: 12.5 % (ref 12.3–15.4)
GLOBULIN UR ELPH-MCNC: 2.4 GM/DL
GLUCOSE SERPL-MCNC: 172 MG/DL (ref 65–99)
HCT VFR BLD AUTO: 46.8 % (ref 37.5–51)
HGB BLD-MCNC: 16.1 G/DL (ref 13–17.7)
IMM GRANULOCYTES # BLD AUTO: 0.01 10*3/MM3 (ref 0–0.05)
IMM GRANULOCYTES NFR BLD AUTO: 0.2 % (ref 0–0.5)
LYMPHOCYTES # BLD AUTO: 0.69 10*3/MM3 (ref 0.7–3.1)
LYMPHOCYTES NFR BLD AUTO: 13.2 % (ref 19.6–45.3)
MCH RBC QN AUTO: 34.8 PG (ref 26.6–33)
MCHC RBC AUTO-ENTMCNC: 34.4 G/DL (ref 31.5–35.7)
MCV RBC AUTO: 101.1 FL (ref 79–97)
MONOCYTES # BLD AUTO: 0.81 10*3/MM3 (ref 0.1–0.9)
MONOCYTES NFR BLD AUTO: 15.5 % (ref 5–12)
NEUTROPHILS NFR BLD AUTO: 3.67 10*3/MM3 (ref 1.7–7)
NEUTROPHILS NFR BLD AUTO: 70.1 % (ref 42.7–76)
PLATELET # BLD AUTO: 152 10*3/MM3 (ref 140–450)
PMV BLD AUTO: 9.1 FL (ref 6–12)
POTASSIUM SERPL-SCNC: 3.2 MMOL/L (ref 3.5–5.2)
PROT SERPL-MCNC: 6.6 G/DL (ref 6–8.5)
RBC # BLD AUTO: 4.63 10*6/MM3 (ref 4.14–5.8)
SODIUM SERPL-SCNC: 135 MMOL/L (ref 136–145)
WBC NRBC COR # BLD AUTO: 5.23 10*3/MM3 (ref 3.4–10.8)

## 2024-06-13 PROCEDURE — 99284 EMERGENCY DEPT VISIT MOD MDM: CPT | Performed by: PHYSICIAN ASSISTANT

## 2024-06-13 PROCEDURE — 80053 COMPREHEN METABOLIC PANEL: CPT | Performed by: PHYSICIAN ASSISTANT

## 2024-06-13 PROCEDURE — 85025 COMPLETE CBC W/AUTO DIFF WBC: CPT | Performed by: PHYSICIAN ASSISTANT

## 2024-06-13 PROCEDURE — 36415 COLL VENOUS BLD VENIPUNCTURE: CPT

## 2024-06-13 PROCEDURE — 99283 EMERGENCY DEPT VISIT LOW MDM: CPT

## 2024-06-13 RX ORDER — POLYETHYLENE GLYCOL 3350 17 G/17G
17 POWDER, FOR SOLUTION ORAL DAILY
Qty: 10 PACKET | Refills: 0 | Status: SHIPPED | OUTPATIENT
Start: 2024-06-13 | End: 2024-06-23

## 2024-06-13 RX ORDER — POTASSIUM CHLORIDE 1.5 G/1.58G
40 POWDER, FOR SOLUTION ORAL ONCE
Status: COMPLETED | OUTPATIENT
Start: 2024-06-13 | End: 2024-06-13

## 2024-06-13 RX ORDER — POLYETHYLENE GLYCOL 3350 17 G/17G
17 POWDER, FOR SOLUTION ORAL DAILY
Qty: 10 PACKET | Refills: 0 | Status: SHIPPED | OUTPATIENT
Start: 2024-06-13 | End: 2024-06-13

## 2024-06-13 RX ADMIN — POTASSIUM CHLORIDE 40 MEQ: 1.5 POWDER, FOR SOLUTION ORAL at 10:47

## 2024-06-13 NOTE — FSED PROVIDER NOTE
Subjective   History of Present Illness    Patient, history of hemorrhoids, reports that he woke up this morning and noticed a small amount of bright red blood in his underwear, this occurred a total of 2-3 times.  Patient reports after the third time seeing the bright red blood he started to get anxious and continue to wipe his rectum multiple times and eventually there was no blood seen on the toilet paper.  Denies any abdominal pain or lightheadedness.    He is followed by MAHI Mott in [facility].  Does not take any anticoagulants.    Review of Systems   Constitutional:  Negative for activity change and appetite change.   Eyes:  Negative for pain.   Respiratory:  Negative for shortness of breath.    Gastrointestinal:  Positive for rectal pain. Negative for nausea and vomiting.        Rectal bleeeding   Musculoskeletal:  Negative for arthralgias.   Skin:  Negative for color change.   Neurological:  Negative for dizziness.   All other systems reviewed and are negative.      Past Medical History:   Diagnosis Date    Abnormal results of liver function studies     Anxiety with depression     Arthritis     Chronic prostatitis     Herpes simplex     Cold sore    Herpesviral infection, unspecified 12/7/2023    Hydrocele     pt states the left!    Hydrocele, left 6/27/2018    Leg pain     Other amnesia     Pain in joint involving multiple sites     Prostatitis     Prostatitis     Sciatica, left side     Sleep apnea     test at age 18s showed mild apnea, no machine       No Known Allergies    Past Surgical History:   Procedure Laterality Date    COLONOSCOPY  2009    COLONOSCOPY N/A 01/26/2024    normal, Dr Livingston at [facility]    EPIDIDYMAL CYST EXCISION  06/27/2018    HYDROCELECTOMY Left 06/27/2018    Procedure: LEFT HYDROCELECTOMY UNILATERAL, EXCISION OF EPIDYDIMAL CYST;  Surgeon: Chago Teran MD;  Location: [facility];  Service: Urology    KNEE ARTHROPLASTY, PARTIAL REPLACEMENT Left     two  scopes prior to replacement    PROSTATE BIOPSY      PTERYGIUM EXCISION W/ GRAFT Right     REPLACEMENT TOTAL KNEE Left 12/13/2010    BIOMET       Family History   Problem Relation Age of Onset    Rheum arthritis Mother     Colon cancer Father     Arthritis Sister     Alzheimer's disease Maternal Grandmother     Cerebral aneurysm Maternal Grandfather     Malig Hyperthermia Neg Hx        Social History     Socioeconomic History    Marital status:     Number of children: 2   Tobacco Use    Smoking status: Former     Types: Cigars     Passive exposure: Never    Smokeless tobacco: Former     Types: Chew   Substance and Sexual Activity    Alcohol use: Not Currently     Alcohol/week: 10.0 standard drinks of alcohol     Types: 10 Cans of beer per week     Comment: 2 days/weeks    Drug use: No    Sexual activity: Defer           Objective   Physical Exam  Vitals and nursing note reviewed.   Constitutional:       Appearance: Normal appearance. He is normal weight.   HENT:      Head: Normocephalic and atraumatic.      Nose: Nose normal.      Mouth/Throat:      Mouth: Mucous membranes are moist.   Eyes:      Pupils: Pupils are equal, round, and reactive to light.   Cardiovascular:      Rate and Rhythm: Normal rate and regular rhythm.      Pulses: Normal pulses.      Heart sounds: Normal heart sounds.   Pulmonary:      Effort: Pulmonary effort is normal.      Breath sounds: Normal breath sounds.   Genitourinary:     Rectum: Tenderness and external hemorrhoid present. No anal fissure.   Musculoskeletal:         General: Normal range of motion.      Cervical back: Normal range of motion.      Right lower leg: No edema.      Left lower leg: No edema.   Skin:     General: Skin is warm.   Neurological:      General: No focal deficit present.      Mental Status: He is alert.   Psychiatric:         Behavior: Behavior is cooperative.         Procedures           ED Course  ED Course as of 06/13/24 1129   Thu Jun 13, 2024   1003  Hemoglobin: 16.1 [SS]   1020 Alkaline Phosphatase(!): 170 [SS]   1020 AST (SGOT)(!): 128  Appears baseline for patient. [SS]   1034 Rectal exam was chaperoned by KIMI Fregoso. [SS]   1038 I discussed with patient regarding his slightly elevated liver function and slightly low potassium and elevated blood sugar, I advise a follow-up with his primary care next week to recheck his symptoms. [SS]      ED Course User Index  [SS] Rosio Rose PA-C                                           Medical Decision Making  Problems Addressed:  Elevated LFTs: complicated acute illness or injury  External hemorrhoid: complicated acute illness or injury  Hypokalemia: complicated acute illness or injury  Rectal bleeding: complicated acute illness or injury    Amount and/or Complexity of Data Reviewed  Labs: ordered. Decision-making details documented in ED Course.    Risk  Prescription drug management.        Final diagnoses:   External hemorrhoid   Rectal bleeding   Elevated LFTs   Hypokalemia       ED Disposition  ED Disposition       ED Disposition   Discharge    Condition   Stable    Comment   --               Mary Velasquez, APRN  [address] [address] [phone]               Medication List        New Prescriptions      hydrocortisone 2.5 % cream  Apply 1 Application topically to the appropriate area as directed 2 (Two) Times a Day for 7 days.     polyethylene glycol 17 g packet  Commonly known as: MIRALAX  Take 17 g by mouth Daily for 10 days.               Where to Get Your Medications        These medications were sent to [address] - [address] [facility] - [phone]  - [phone] FX  [address] [facility] [address] [facility] [address]      Phone: [phone]   hydrocortisone 2.5 % cream  polyethylene glycol 17 g packet

## 2024-06-13 NOTE — DISCHARGE INSTRUCTIONS
Please apply the hydrocortisone cream to the affected area.  I also advise you take MiraLAX, this will alleviate the discomfort when you are having bowel movements.  As discussed, it is important that you call Dr. Livingston your GI doctor today for close follow-up regarding your symptoms.  If you develop worsening bleeding or lightheadedness or any other concerning or worsening symptoms, return to the ER.  Follow-up with your primary care doctor later this week to recheck your symptoms and to recheck your liver enzymes and potassium. Please increase your potassium intake the next few days, this may include salmon, bananas, milk, lentil, oranges, etc.

## 2024-06-13 NOTE — Clinical Note
[facility]  [address]  [place] [address]    Alexandr Willis was seen and treated in our emergency department on 6/13/2024.  He may return to work on 06/13/2024.  Please excuse patient from work this morning, he was evaluated here at [facility].       Thank you for choosing [facility].    Rosio Rose PA-C

## 2024-06-17 ENCOUNTER — TELEPHONE (OUTPATIENT)
Dept: FAMILY MEDICINE CLINIC | Age: 54
End: 2024-06-17
Payer: COMMERCIAL

## 2024-06-17 NOTE — TELEPHONE ENCOUNTER
"Caller: Alexandr Willis \"ELIDIA\"    Relationship: Self    Best call back number: [phone]    What orders are you requesting (i.e. lab or imaging): LAB WORK     In what timeframe would the patient need to come in: THIS FRIDAY     Where will you receive your lab/imaging services: [facility]     Additional notes: ER ADVISED PATIENT TO FOLLOW UP WITH LAB WORK THAT WAS DONE TO BE REPEATED   PLEASE ADVISE WHEN ORDERS HAVE BEEN ENTERED OKAY TO LEAVE MESSAGE         "

## 2024-06-20 ENCOUNTER — OFFICE VISIT (OUTPATIENT)
Dept: FAMILY MEDICINE CLINIC | Age: 54
End: 2024-06-20
Payer: COMMERCIAL

## 2024-06-20 ENCOUNTER — LAB (OUTPATIENT)
Dept: LAB | Facility: HOSPITAL | Age: 54
End: 2024-06-20
Payer: COMMERCIAL

## 2024-06-20 VITALS
SYSTOLIC BLOOD PRESSURE: 141 MMHG | DIASTOLIC BLOOD PRESSURE: 90 MMHG | BODY MASS INDEX: 21.71 KG/M2 | HEART RATE: 79 BPM | TEMPERATURE: 98.7 F | OXYGEN SATURATION: 99 % | WEIGHT: 146.6 LBS | HEIGHT: 69 IN

## 2024-06-20 DIAGNOSIS — R79.89 ABNORMAL THYROID BLOOD TEST: ICD-10-CM

## 2024-06-20 DIAGNOSIS — R79.89 ABNORMAL LIVER FUNCTION TEST: ICD-10-CM

## 2024-06-20 DIAGNOSIS — K64.9 HEMORRHOIDS, UNSPECIFIED HEMORRHOID TYPE: ICD-10-CM

## 2024-06-20 DIAGNOSIS — E87.6 HYPOKALEMIA: ICD-10-CM

## 2024-06-20 DIAGNOSIS — K62.5 RECTAL BLEEDING: ICD-10-CM

## 2024-06-20 DIAGNOSIS — S20.212A CONTUSION, CHEST WALL, LEFT, INITIAL ENCOUNTER: ICD-10-CM

## 2024-06-20 DIAGNOSIS — F41.9 ANXIETY: ICD-10-CM

## 2024-06-20 DIAGNOSIS — E03.8 SUBCLINICAL HYPOTHYROIDISM: ICD-10-CM

## 2024-06-20 DIAGNOSIS — R79.89 ABNORMAL LIVER FUNCTION TEST: Primary | ICD-10-CM

## 2024-06-20 LAB
ALBUMIN SERPL-MCNC: 4.3 G/DL (ref 3.5–5.2)
ALBUMIN/GLOB SERPL: 1.6 G/DL
ALP SERPL-CCNC: 136 U/L (ref 39–117)
ALT SERPL W P-5'-P-CCNC: 79 U/L (ref 1–41)
ANION GAP SERPL CALCULATED.3IONS-SCNC: 10.2 MMOL/L (ref 5–15)
AST SERPL-CCNC: 72 U/L (ref 1–40)
BASOPHILS # BLD AUTO: 0.03 10*3/MM3 (ref 0–0.2)
BASOPHILS NFR BLD AUTO: 0.3 % (ref 0–1.5)
BILIRUB SERPL-MCNC: 0.5 MG/DL (ref 0–1.2)
BUN SERPL-MCNC: 4 MG/DL (ref 6–20)
BUN/CREAT SERPL: 4.7 (ref 7–25)
CALCIUM SPEC-SCNC: 9.7 MG/DL (ref 8.6–10.5)
CHLORIDE SERPL-SCNC: 104 MMOL/L (ref 98–107)
CO2 SERPL-SCNC: 28.8 MMOL/L (ref 22–29)
CREAT SERPL-MCNC: 0.86 MG/DL (ref 0.76–1.27)
DEPRECATED RDW RBC AUTO: 50.1 FL (ref 37–54)
EGFRCR SERPLBLD CKD-EPI 2021: 101.6 ML/MIN/1.73
EOSINOPHIL # BLD AUTO: 0.02 10*3/MM3 (ref 0–0.4)
EOSINOPHIL NFR BLD AUTO: 0.2 % (ref 0.3–6.2)
ERYTHROCYTE [DISTWIDTH] IN BLOOD BY AUTOMATED COUNT: 12.7 % (ref 12.3–15.4)
GLOBULIN UR ELPH-MCNC: 2.7 GM/DL
GLUCOSE SERPL-MCNC: 94 MG/DL (ref 65–99)
HCT VFR BLD AUTO: 49.3 % (ref 37.5–51)
HGB BLD-MCNC: 16.4 G/DL (ref 13–17.7)
IMM GRANULOCYTES # BLD AUTO: 0.04 10*3/MM3 (ref 0–0.05)
IMM GRANULOCYTES NFR BLD AUTO: 0.4 % (ref 0–0.5)
LYMPHOCYTES # BLD AUTO: 2.52 10*3/MM3 (ref 0.7–3.1)
LYMPHOCYTES NFR BLD AUTO: 25 % (ref 19.6–45.3)
MCH RBC QN AUTO: 34.8 PG (ref 26.6–33)
MCHC RBC AUTO-ENTMCNC: 33.3 G/DL (ref 31.5–35.7)
MCV RBC AUTO: 104.7 FL (ref 79–97)
MONOCYTES # BLD AUTO: 1.13 10*3/MM3 (ref 0.1–0.9)
MONOCYTES NFR BLD AUTO: 11.2 % (ref 5–12)
NEUTROPHILS NFR BLD AUTO: 6.36 10*3/MM3 (ref 1.7–7)
NEUTROPHILS NFR BLD AUTO: 62.9 % (ref 42.7–76)
PLATELET # BLD AUTO: 294 10*3/MM3 (ref 140–450)
PMV BLD AUTO: 9.5 FL (ref 6–12)
POTASSIUM SERPL-SCNC: 4 MMOL/L (ref 3.5–5.2)
PROT SERPL-MCNC: 7 G/DL (ref 6–8.5)
RBC # BLD AUTO: 4.71 10*6/MM3 (ref 4.14–5.8)
SODIUM SERPL-SCNC: 143 MMOL/L (ref 136–145)
T4 FREE SERPL-MCNC: 1.31 NG/DL (ref 0.92–1.68)
TSH SERPL DL<=0.05 MIU/L-ACNC: 4.43 UIU/ML (ref 0.27–4.2)
WBC NRBC COR # BLD AUTO: 10.1 10*3/MM3 (ref 3.4–10.8)

## 2024-06-20 PROCEDURE — 99214 OFFICE O/P EST MOD 30 MIN: CPT | Performed by: NURSE PRACTITIONER

## 2024-06-20 PROCEDURE — 85025 COMPLETE CBC W/AUTO DIFF WBC: CPT

## 2024-06-20 PROCEDURE — 84443 ASSAY THYROID STIM HORMONE: CPT

## 2024-06-20 PROCEDURE — 36415 COLL VENOUS BLD VENIPUNCTURE: CPT

## 2024-06-20 PROCEDURE — 80053 COMPREHEN METABOLIC PANEL: CPT

## 2024-06-20 PROCEDURE — 84439 ASSAY OF FREE THYROXINE: CPT

## 2024-06-20 NOTE — PROGRESS NOTES
Chief Complaint  Follow-up ([facility] ER - 6/13- external hemorrhoid, elevated LFT's, and hypokalemia )    Subjective          Alexandr Willis presents to [facility]    History of Present Illness  Follow up ER 6-13-24  Went to ER for bright read rectal bleeding (started 6-12-24 ) was also not feeling well  Now improving, but still having slight blood  (Did drink beer last night, does drink 3-4 beers a week usually)  Given mirlax and hydrocortisone cream, advised to follow up wiht Dr stone and increase K in diet and have labs rechecked   ER dg:  Hemorrhoid  Elevated LFT's   Hypokalemia   Labs:  Lab Results       Component                Value               Date                       GLUCOSE                  172 (H)             06/13/2024                 BUN                      5 (L)               06/13/2024                 CREATININE               0.75 (L)            06/13/2024                 EGFRIFNONA               109                 06/09/2021                 BCR                      6.7 (L)             06/13/2024                 K                        3.2 (L)             06/13/2024                 CO2                      28.0                06/13/2024                 CALCIUM                  9.3                 06/13/2024                 ALBUMIN                  4.2                 06/13/2024                 LABIL2                   1.1                 10/29/2021                 AST                      128 (H)             06/13/2024                 ALT                      89 (H)              06/13/2024                Past Medical History changes since 12-7-2023:       Has acne rosacea and on doxy  covid + 6-2022 after going on cruise     Positive for    Prostatitis; /Dr David Teran, [facility]         PREVENTIVE HEALTH MAINTENANCE             COLORECTAL CANCER SCREENING: Up to date (colonoscopy q10y; sigmoidoscopy q5y; Cologuard q3y) was last done 1- normal  &  18, Results are in chart; colonoscopy with normal results;         Surgical History:         Arthroscopy: knee left X 2;     Biopsy of prostate     Biomet Knee replacement 2010, left;    hemorrhoid banding; Procedures: colonoscopy      Procedures: epididymal cysts 18         Family History:       Father:  at age 74; Cause of death was colon cancer     Mother:  at age 60's;  Rheumatoid Arthritis     Brother(s): Healthy; 1 brother(s) total     Sister(s): 1 sister(s) total;  arthritis     Paternal Grandfather: Cause of death was cerebral aneurysm     Paternal Grandmother: ;  Alzheimer's Disease     Maternal Grandfather:      Maternal Grandmother:  at age 80's         Social History:       Occupation: [employer]   Was called Natalia     Marital Status:  ( and remarried)  again     Children: 2 children              Past Medical History:   Diagnosis Date    Abnormal results of liver function studies     Anxiety with depression     Arthritis     Chronic prostatitis     Herpes simplex     Cold sore    Herpesviral infection, unspecified 2023    Hydrocele     pt states the left!    Hydrocele, left 2018    Leg pain     Other amnesia     Pain in joint involving multiple sites     Prostatitis     Prostatitis     Sciatica, left side     Sleep apnea     test at age 18s showed mild apnea, no machine       Allergies   Allergen Reactions    Poison Ivy Extract Rash        Past Surgical History:   Procedure Laterality Date    COLONOSCOPY      COLONOSCOPY N/A 2024    normal, Dr Livingston at [facility]    EPIDIDYMAL CYST EXCISION  2018    HYDROCELECTOMY Left 2018    Procedure: LEFT HYDROCELECTOMY UNILATERAL, EXCISION OF EPIDYDIMAL CYST;  Surgeon: Chago Teran MD;  Location: [facility];  Service: Urology    KNEE ARTHROPLASTY, PARTIAL REPLACEMENT Left     two scopes prior to replacement    PROSTATE BIOPSY       PTERYGIUM EXCISION W/ GRAFT Right     REPLACEMENT TOTAL KNEE Left 2010    BIOMET        Social History     Tobacco Use    Smoking status: Former     Types: Cigars     Start date: 2000     Quit date: 2024     Years since quittin.2     Passive exposure: Never    Smokeless tobacco: Former     Types: Chew   Substance Use Topics    Alcohol use: Not Currently     Alcohol/week: 10.0 standard drinks of alcohol     Types: 10 Cans of beer per week     Comment: 2 days/weeks       Family History   Problem Relation Age of Onset    Rheum arthritis Mother     Colon cancer Father     Arthritis Sister     Alzheimer's disease Maternal Grandmother     Cerebral aneurysm Maternal Grandfather     Malig Hyperthermia Neg Hx         Health Maintenance Due   Topic Date Due    TDAP/TD VACCINES (1 - Tdap) Never done    ZOSTER VACCINE (1 of 2) Never done    COVID-19 Vaccine (3 - 2023-24 season) 2023    ANNUAL PHYSICAL  2024        Current Outpatient Medications on File Prior to Visit   Medication Sig    doxycycline (VIBRAMYCIN) 100 MG capsule Take 1 capsule by mouth Every 12 (Twelve) Hours.    fluticasone (FLONASE) 50 MCG/ACT nasal spray 2 sprays into the nostril(s) as directed by provider Daily.    hydrocortisone 2.5 % cream Apply 1 Application topically to the appropriate area as directed 2 (Two) Times a Day for 7 days.    ibuprofen (ADVIL,MOTRIN) 400 MG tablet Take 1 tablet by mouth Every 6 (Six) Hours As Needed for Mild Pain. Stopped preop, last dose 18    metroNIDAZOLE (METROGEL) 0.75 % gel APPLY THIN LAYER TOPICALLY TO AFFECTED AREA ONCE DAILY    polyethylene glycol (MIRALAX) 17 g packet Take 17 g by mouth Daily for 10 days.    valACYclovir (VALTREX) 500 MG tablet Take 1 tablet by mouth As Needed (feverblisters).    [DISCONTINUED] celecoxib (CeleBREX) 200 MG capsule Take 1 capsule by mouth Daily As Needed for Mild Pain. Stopped preop, last dose 2018 (Patient not taking: Reported on 2024)  "    No current facility-administered medications on file prior to visit.       Immunization History   Administered Date(s) Administered    COVID-19 (PFIZER) Purple Cap Monovalent 11/06/2021, 11/28/2021    Fluzone (or Fluarix & Flulaval for VFC) >6mos 09/14/2016    Influenza Quad Vaccine (Inpatient) 10/13/2017       Review of Systems   Constitutional:  Negative for fatigue and fever.   Respiratory:  Negative for cough and shortness of breath.    Cardiovascular:  Negative for chest pain, palpitations and leg swelling.   Gastrointestinal:  Positive for blood in stool. Negative for nausea.   Musculoskeletal:         Fell last Saturday at home and tripped and hit a wooden bench to left ribs prior to the onset of his rectal bleeding    Psychiatric/Behavioral:  Positive for stress (lot of stress in life now, and dealing with ex wives).         Objective     Vitals:    06/20/24 0826 06/20/24 0857   BP: 136/97 141/90   BP Location:  Right arm   Patient Position:  Sitting   Cuff Size:  Adult   Pulse: 87 79   Temp: 98.7 °F (37.1 °C)    TempSrc: Oral    SpO2: 99%  Comment: room air    Weight: 66.5 kg (146 lb 9.6 oz)    Height: 175.3 cm (69.02\")             Physical Exam  Vitals reviewed.   Constitutional:       General: He is not in acute distress.     Appearance: Normal appearance.   Neck:      Vascular: No carotid bruit.   Cardiovascular:      Rate and Rhythm: Normal rate and regular rhythm.      Heart sounds: Normal heart sounds. No murmur heard.  Pulmonary:      Effort: Pulmonary effort is normal. No respiratory distress.      Breath sounds: Normal breath sounds.   Musculoskeletal:         General: Tenderness (to left lateral to posterior ribs with bruising) present.      Right lower leg: No edema.      Left lower leg: No edema.   Neurological:      Mental Status: He is alert.   Psychiatric:         Mood and Affect: Mood is anxious.         Behavior: Behavior normal.         Result Review :     The following data was " reviewed by: OSWALDO White on 06/20/2024:                       Assessment and Plan      Diagnoses and all orders for this visit:    1. Abnormal liver function test (Primary)  Assessment & Plan:  Reviewed ER note, labs, repeat CMP today     Orders:  -     Comprehensive metabolic panel; Future    2. Hemorrhoids, unspecified hemorrhoid type  Assessment & Plan:  Reviewed ER note, keep appt with Dr Livingston, reviewed 1-2024 CLN       3. Hypokalemia  Assessment & Plan:  continue dietary supplement and rx, reviewed ER records/labs, repeat labs today     Orders:  -     Comprehensive metabolic panel; Future    4. Rectal bleeding  Assessment & Plan:  Has an appt with Dr Livingston this afternoon     Orders:  -     CBC w AUTO Differential; Future    5. Contusion, chest wall, left, initial encounter  Assessment & Plan:  Continue to observe       6. Anxiety  Assessment & Plan:  Offered rx, declined, repeat bp up, monitor BP, follow up if symptoms persist or change           BMI is within normal parameters. No other follow-up for BMI required.           Follow Up     Return if symptoms worsen or fail to improve, for followup pending lab results.    Patient was given instructions and counseling regarding his condition or for health maintenance advice. Please see specific information pulled into the AVS if appropriate.

## 2024-06-21 ENCOUNTER — TELEPHONE (OUTPATIENT)
Dept: ENDOCRINOLOGY | Age: 54
End: 2024-06-21
Payer: COMMERCIAL

## 2024-06-21 DIAGNOSIS — R79.89 ABNORMAL THYROID BLOOD TEST: Primary | ICD-10-CM

## 2024-06-21 DIAGNOSIS — E03.8 SUBCLINICAL HYPOTHYROIDISM: ICD-10-CM

## 2024-06-21 NOTE — TELEPHONE ENCOUNTER
Left pt a message to return call.    OKAY FOR HUB AND  TO READ RESULTS.      Please let him know his TSH is improving.  His thyroid function test is consistent with subclinical hypothyroidism, since his TSH is less than 10 he does not need to start the treatment.  I will check his thyroid levels before his next visit in October.

## 2024-07-01 ENCOUNTER — TELEPHONE (OUTPATIENT)
Dept: FAMILY MEDICINE CLINIC | Age: 54
End: 2024-07-01
Payer: COMMERCIAL

## 2024-07-01 DIAGNOSIS — R79.89 ELEVATED LFTS: Primary | ICD-10-CM

## 2024-07-01 NOTE — TELEPHONE ENCOUNTER
----- Message from Mary Velasquez sent at 6/22/2024 10:41 AM EDT -----  Send a repeat CMP for a month from this last lab for dg of elevated LFT's

## 2024-08-20 ENCOUNTER — TELEPHONE (OUTPATIENT)
Dept: FAMILY MEDICINE CLINIC | Age: 54
End: 2024-08-20
Payer: COMMERCIAL

## 2024-08-20 NOTE — TELEPHONE ENCOUNTER
Pt returned call and was informed of the lab in his chart. He was informed of their hours of operation as well.

## 2024-09-19 ENCOUNTER — OFFICE VISIT (OUTPATIENT)
Dept: FAMILY MEDICINE CLINIC | Age: 54
End: 2024-09-19
Payer: COMMERCIAL

## 2024-09-19 VITALS
WEIGHT: 142.8 LBS | SYSTOLIC BLOOD PRESSURE: 111 MMHG | HEART RATE: 102 BPM | DIASTOLIC BLOOD PRESSURE: 88 MMHG | TEMPERATURE: 98.2 F | BODY MASS INDEX: 21.15 KG/M2 | HEIGHT: 69 IN

## 2024-09-19 DIAGNOSIS — B34.9 VIRAL ILLNESS: Primary | ICD-10-CM

## 2024-09-19 LAB
EXPIRATION DATE: NORMAL
FLUAV AG UPPER RESP QL IA.RAPID: NOT DETECTED
FLUBV AG UPPER RESP QL IA.RAPID: NOT DETECTED
INTERNAL CONTROL: NORMAL
Lab: NORMAL
SARS-COV-2 AG UPPER RESP QL IA.RAPID: NOT DETECTED

## 2024-09-19 PROCEDURE — 87428 SARSCOV & INF VIR A&B AG IA: CPT | Performed by: NURSE PRACTITIONER

## 2024-09-19 PROCEDURE — 99213 OFFICE O/P EST LOW 20 MIN: CPT | Performed by: NURSE PRACTITIONER

## 2024-09-19 RX ORDER — CHLORCYCLIZINE HYDROCHLORIDE AND PSEUDOEPHEDRINE HYDROCHLORIDE 25; 60 MG/1; MG/1
1 TABLET ORAL 3 TIMES DAILY PRN
Qty: 21 TABLET | Refills: 0 | Status: SHIPPED | OUTPATIENT
Start: 2024-09-19

## 2024-09-19 RX ORDER — CELECOXIB 100 MG/1
100 CAPSULE ORAL 2 TIMES DAILY
COMMUNITY

## (undated) DEVICE — NDL HYPO PRECISIONGLIDE REG 25G 1 1/2

## (undated) DEVICE — SUT GUT CHRM 4/0 RB1 27IN U203H

## (undated) DEVICE — PREMIUM WET SKIN PREP TRAY: Brand: MEDLINE INDUSTRIES, INC.

## (undated) DEVICE — LOU MINOR PROCEDURE: Brand: MEDLINE INDUSTRIES, INC.

## (undated) DEVICE — GLV SURG BIOGEL LTX PF 7

## (undated) DEVICE — ELECTRD NDL EDGE/STD 2.84IN

## (undated) DEVICE — SUT GUT CHRM 3/0 SH 27IN G122H

## (undated) DEVICE — SUSP M ELAS W/STRAP LG BX/1

## (undated) DEVICE — BANDAGE,GAUZE,BULKEE II,4.5"X4.1YD,STRL: Brand: MEDLINE